# Patient Record
Sex: MALE | Race: WHITE | NOT HISPANIC OR LATINO | Employment: OTHER | URBAN - NONMETROPOLITAN AREA
[De-identification: names, ages, dates, MRNs, and addresses within clinical notes are randomized per-mention and may not be internally consistent; named-entity substitution may affect disease eponyms.]

---

## 2023-02-10 PROBLEM — R73.03 PREDIABETES: Status: ACTIVE | Noted: 2023-02-10

## 2023-02-10 PROBLEM — I10 BENIGN ESSENTIAL HYPERTENSION: Status: ACTIVE | Noted: 2023-02-10

## 2023-02-10 PROBLEM — R35.1 BPH ASSOCIATED WITH NOCTURIA: Status: ACTIVE | Noted: 2023-02-10

## 2023-02-10 PROBLEM — J30.2 SEASONAL ALLERGIES: Status: ACTIVE | Noted: 2023-02-10

## 2023-02-10 PROBLEM — I65.29 CAROTID ATHEROSCLEROSIS: Status: ACTIVE | Noted: 2023-02-10

## 2023-02-10 PROBLEM — M47.812 DJD (DEGENERATIVE JOINT DISEASE) OF CERVICAL SPINE: Status: ACTIVE | Noted: 2023-02-10

## 2023-02-10 PROBLEM — E78.5 HYPERLIPIDEMIA: Status: ACTIVE | Noted: 2023-02-10

## 2023-02-10 PROBLEM — N40.1 BPH ASSOCIATED WITH NOCTURIA: Status: ACTIVE | Noted: 2023-02-10

## 2023-02-10 PROBLEM — G25.81 RESTLESS LEGS SYNDROME: Status: ACTIVE | Noted: 2023-02-10

## 2023-02-10 PROBLEM — R51.9 CHRONIC DAILY HEADACHE: Status: ACTIVE | Noted: 2023-02-10

## 2023-02-10 PROBLEM — E55.9 VITAMIN D DEFICIENCY: Status: ACTIVE | Noted: 2023-02-10

## 2023-02-10 PROBLEM — M81.0 OSTEOPOROSIS: Status: ACTIVE | Noted: 2023-02-10

## 2023-02-10 RX ORDER — ACETAMINOPHEN 160 MG/5ML
SUSPENSION, ORAL (FINAL DOSE FORM) ORAL
COMMUNITY

## 2023-02-10 RX ORDER — VIT C/E/ZN/COPPR/LUTEIN/ZEAXAN 250MG-90MG
2 CAPSULE ORAL DAILY
COMMUNITY

## 2023-02-10 RX ORDER — NAPROXEN SODIUM 220 MG/1
1 TABLET, FILM COATED ORAL DAILY
COMMUNITY

## 2023-02-10 RX ORDER — LISINOPRIL 5 MG/1
1 TABLET ORAL DAILY
COMMUNITY
End: 2023-09-13 | Stop reason: SDUPTHER

## 2023-02-10 RX ORDER — ATORVASTATIN CALCIUM 40 MG/1
1 TABLET, FILM COATED ORAL DAILY
COMMUNITY
End: 2023-09-13 | Stop reason: SDUPTHER

## 2023-03-23 ENCOUNTER — OFFICE VISIT (OUTPATIENT)
Dept: PRIMARY CARE | Facility: CLINIC | Age: 78
End: 2023-03-23
Payer: MEDICARE

## 2023-03-23 ENCOUNTER — LAB (OUTPATIENT)
Dept: LAB | Facility: LAB | Age: 78
End: 2023-03-23
Payer: MEDICARE

## 2023-03-23 VITALS
DIASTOLIC BLOOD PRESSURE: 65 MMHG | HEIGHT: 70 IN | HEART RATE: 71 BPM | OXYGEN SATURATION: 97 % | TEMPERATURE: 97.5 F | BODY MASS INDEX: 23.56 KG/M2 | SYSTOLIC BLOOD PRESSURE: 122 MMHG | WEIGHT: 164.6 LBS | RESPIRATION RATE: 14 BRPM

## 2023-03-23 DIAGNOSIS — R73.03 PREDIABETES: ICD-10-CM

## 2023-03-23 DIAGNOSIS — R35.1 BPH ASSOCIATED WITH NOCTURIA: ICD-10-CM

## 2023-03-23 DIAGNOSIS — E55.9 VITAMIN D DEFICIENCY: ICD-10-CM

## 2023-03-23 DIAGNOSIS — N40.1 BPH ASSOCIATED WITH NOCTURIA: ICD-10-CM

## 2023-03-23 DIAGNOSIS — Z13.89 SCREENING FOR MULTIPLE CONDITIONS: ICD-10-CM

## 2023-03-23 DIAGNOSIS — I65.23 ATHEROSCLEROSIS OF BOTH CAROTID ARTERIES: ICD-10-CM

## 2023-03-23 DIAGNOSIS — I10 BENIGN ESSENTIAL HYPERTENSION: ICD-10-CM

## 2023-03-23 DIAGNOSIS — M81.6 LOCALIZED OSTEOPOROSIS WITHOUT CURRENT PATHOLOGICAL FRACTURE: ICD-10-CM

## 2023-03-23 DIAGNOSIS — Z00.00 ROUTINE GENERAL MEDICAL EXAMINATION AT HEALTH CARE FACILITY: Primary | ICD-10-CM

## 2023-03-23 DIAGNOSIS — E78.2 MIXED HYPERLIPIDEMIA: ICD-10-CM

## 2023-03-23 PROBLEM — G25.81 RESTLESS LEGS SYNDROME: Status: RESOLVED | Noted: 2023-02-10 | Resolved: 2023-03-23

## 2023-03-23 LAB
BASOPHILS (10*3/UL) IN BLOOD BY AUTOMATED COUNT: 0.07 X10E9/L (ref 0–0.1)
BASOPHILS/100 LEUKOCYTES IN BLOOD BY AUTOMATED COUNT: 0.9 % (ref 0–2)
EOSINOPHILS (10*3/UL) IN BLOOD BY AUTOMATED COUNT: 0.16 X10E9/L (ref 0–0.4)
EOSINOPHILS/100 LEUKOCYTES IN BLOOD BY AUTOMATED COUNT: 2.2 % (ref 0–6)
ERYTHROCYTE DISTRIBUTION WIDTH (RATIO) BY AUTOMATED COUNT: 13.3 % (ref 11.5–14.5)
ERYTHROCYTE MEAN CORPUSCULAR HEMOGLOBIN CONCENTRATION (G/DL) BY AUTOMATED: 31.4 G/DL (ref 32–36)
ERYTHROCYTE MEAN CORPUSCULAR VOLUME (FL) BY AUTOMATED COUNT: 98 FL (ref 80–100)
ERYTHROCYTES (10*6/UL) IN BLOOD BY AUTOMATED COUNT: 4.14 X10E12/L (ref 4.5–5.9)
ESTIMATED AVERAGE GLUCOSE FOR HBA1C: 120 MG/DL
HEMATOCRIT (%) IN BLOOD BY AUTOMATED COUNT: 40.4 % (ref 41–52)
HEMOGLOBIN (G/DL) IN BLOOD: 12.7 G/DL (ref 13.5–17.5)
HEMOGLOBIN A1C/HEMOGLOBIN TOTAL IN BLOOD: 5.8 %
IMMATURE GRANULOCYTES/100 LEUKOCYTES IN BLOOD BY AUTOMATED COUNT: 0.3 % (ref 0–0.9)
LEUKOCYTES (10*3/UL) IN BLOOD BY AUTOMATED COUNT: 7.4 X10E9/L (ref 4.4–11.3)
LYMPHOCYTES (10*3/UL) IN BLOOD BY AUTOMATED COUNT: 2.14 X10E9/L (ref 0.8–3)
LYMPHOCYTES/100 LEUKOCYTES IN BLOOD BY AUTOMATED COUNT: 28.8 % (ref 13–44)
MONOCYTES (10*3/UL) IN BLOOD BY AUTOMATED COUNT: 0.8 X10E9/L (ref 0.05–0.8)
MONOCYTES/100 LEUKOCYTES IN BLOOD BY AUTOMATED COUNT: 10.8 % (ref 2–10)
NEUTROPHILS (10*3/UL) IN BLOOD BY AUTOMATED COUNT: 4.23 X10E9/L (ref 1.6–5.5)
NEUTROPHILS/100 LEUKOCYTES IN BLOOD BY AUTOMATED COUNT: 57 % (ref 40–80)
NRBC (PER 100 WBCS) BY AUTOMATED COUNT: 0 /100 WBC (ref 0–0)
PLATELETS (10*3/UL) IN BLOOD AUTOMATED COUNT: 239 X10E9/L (ref 150–450)

## 2023-03-23 PROCEDURE — 3078F DIAST BP <80 MM HG: CPT | Performed by: FAMILY MEDICINE

## 2023-03-23 PROCEDURE — 82728 ASSAY OF FERRITIN: CPT

## 2023-03-23 PROCEDURE — 36415 COLL VENOUS BLD VENIPUNCTURE: CPT

## 2023-03-23 PROCEDURE — 83540 ASSAY OF IRON: CPT

## 2023-03-23 PROCEDURE — 1157F ADVNC CARE PLAN IN RCRD: CPT | Performed by: FAMILY MEDICINE

## 2023-03-23 PROCEDURE — 80061 LIPID PANEL: CPT

## 2023-03-23 PROCEDURE — 83036 HEMOGLOBIN GLYCOSYLATED A1C: CPT

## 2023-03-23 PROCEDURE — 3074F SYST BP LT 130 MM HG: CPT | Performed by: FAMILY MEDICINE

## 2023-03-23 PROCEDURE — 80053 COMPREHEN METABOLIC PANEL: CPT

## 2023-03-23 PROCEDURE — 1159F MED LIST DOCD IN RCRD: CPT | Performed by: FAMILY MEDICINE

## 2023-03-23 PROCEDURE — 1170F FXNL STATUS ASSESSED: CPT | Performed by: FAMILY MEDICINE

## 2023-03-23 PROCEDURE — G0444 DEPRESSION SCREEN ANNUAL: HCPCS | Performed by: FAMILY MEDICINE

## 2023-03-23 PROCEDURE — G0439 PPPS, SUBSEQ VISIT: HCPCS | Performed by: FAMILY MEDICINE

## 2023-03-23 PROCEDURE — 85045 AUTOMATED RETICULOCYTE COUNT: CPT

## 2023-03-23 PROCEDURE — 82306 VITAMIN D 25 HYDROXY: CPT

## 2023-03-23 PROCEDURE — 83550 IRON BINDING TEST: CPT

## 2023-03-23 PROCEDURE — 82746 ASSAY OF FOLIC ACID SERUM: CPT

## 2023-03-23 PROCEDURE — 85025 COMPLETE CBC W/AUTO DIFF WBC: CPT

## 2023-03-23 PROCEDURE — 82607 VITAMIN B-12: CPT

## 2023-03-23 PROCEDURE — 1160F RVW MEDS BY RX/DR IN RCRD: CPT | Performed by: FAMILY MEDICINE

## 2023-03-23 PROCEDURE — 1036F TOBACCO NON-USER: CPT | Performed by: FAMILY MEDICINE

## 2023-03-23 ASSESSMENT — PATIENT HEALTH QUESTIONNAIRE - PHQ9
2. FEELING DOWN, DEPRESSED OR HOPELESS: NOT AT ALL
SUM OF ALL RESPONSES TO PHQ9 QUESTIONS 1 AND 2: 0
1. LITTLE INTEREST OR PLEASURE IN DOING THINGS: NOT AT ALL

## 2023-03-23 ASSESSMENT — ACTIVITIES OF DAILY LIVING (ADL)
MANAGING_FINANCES: INDEPENDENT
DRESSING: INDEPENDENT
BATHING: INDEPENDENT
DOING_HOUSEWORK: INDEPENDENT
TAKING_MEDICATION: INDEPENDENT
GROCERY_SHOPPING: INDEPENDENT

## 2023-03-23 NOTE — ASSESSMENT & PLAN NOTE
Wakes up 2-3 times per night to urinate. Current symptoms are tolerable.  Failed on Flomax in the past.  Discussed treatment with finasteride but was declined by patient.

## 2023-03-23 NOTE — ASSESSMENT & PLAN NOTE
Stable. Good pulse on physical exam today.  Less than 50% stenosis of the right ICA per US from 2017.  Continue cholesterol control, blood pressure control, and aspirin.

## 2023-03-23 NOTE — PROGRESS NOTES
"Subjective   Reason for Visit: Judd Davies is an 78 y.o. male here for a Medicare Wellness visit.      Flu shot- 11/05/2022  COVID shot- moderna   Tdap- Due    Colon Screen- 2010, no longer needed due to age      Reviewed all medications by prescribing practitioner or clinical pharmacist (such as prescriptions, OTCs, herbal therapies and supplements) and documented in the medical record.    HPI  Patient takes CoQ10 daily to prevent statin muscle pain side effect.    BPH: Patient wakes up 2-3 times per night to urinate. He has taken Flomax in the past but it did not provide relief. He finds the current symptoms are manageable.    Patient Care Team:  Haris Osborn MD as PCP - General  Haris Osborn MD as PCP - [employer] PCP     Review of Systems  constitutional: as per HPI  eyes:as per HPI  CV:as per HPI  Respiratory:as per HPI  GI:as per HPI  neuro:as per HPI  endo:as per HPI  heme/lymph:as per HPI     Objective   Vitals:  /65 (BP Location: Right arm, Patient Position: Sitting, BP Cuff Size: Adult)   Pulse 71   Temp 36.4 °C (97.5 °F) (Temporal)   Resp 14   Ht 1.778 m (5' 10\")   Wt 74.7 kg (164 lb 9.6 oz)   SpO2 97%   BMI 23.62 kg/m²       Physical Exam  Constitutional:       Appearance: Normal appearance. He is normal weight.   HENT:      Head: Normocephalic.      Right Ear: Tympanic membrane, ear canal and external ear normal.      Left Ear: Tympanic membrane, ear canal and external ear normal.      Nose: Nose normal.      Mouth/Throat:      Mouth: Mucous membranes are moist.   Eyes:      Conjunctiva/sclera: Conjunctivae normal.      Pupils: Pupils are equal, round, and reactive to light.   Cardiovascular:      Rate and Rhythm: Normal rate and regular rhythm.      Pulses: Normal pulses.      Comments: Strong carotid pulses bilaterally.  Pulmonary:      Effort: Pulmonary effort is normal.      Breath sounds: Normal breath sounds.   Abdominal:      General: Abdomen is flat. Bowel sounds " are normal.      Palpations: Abdomen is soft.   Musculoskeletal:         General: Normal range of motion.      Cervical back: Neck supple.   Skin:     General: Skin is warm.   Neurological:      General: No focal deficit present.      Mental Status: He is alert and oriented to person, place, and time.   Psychiatric:         Mood and Affect: Mood normal.         Behavior: Behavior normal.         Thought Content: Thought content normal.         Judgment: Judgment normal.         Assessment/Plan   Problem List Items Addressed This Visit          Circulatory    Benign essential hypertension    Current Assessment & Plan     Hypertension:  Blood pressure in office today was   BP Readings from Last 1 Encounters:   03/23/23 122/65   The goal range for blood pressure is below 130/80.   We will continue to monitor.   Continue Lisinopril as prescribed          Relevant Orders    CBC and Auto Differential    Comprehensive Metabolic Panel    Follow Up In [facility] - PCP    Carotid atherosclerosis    Current Assessment & Plan     Stable. Good pulse on physical exam today.  Less than 50% stenosis of the right ICA per US from 2017.  Continue cholesterol control, blood pressure control, and aspirin.         Relevant Orders    CBC and Auto Differential    Follow Up [facility] - PCP       Musculoskeletal    Osteoporosis    Current Assessment & Plan     Upper extremity affected.  Continue vitamin D supplement.  Encouraged to engage in regular light resistance training (bands or weights) to preserve bone structure.            Endocrine/Metabolic    Prediabetes    Current Assessment & Plan     Ordered A1c    Most recent A1c:   Hemoglobin A1C (%)   Date Value   03/22/2022 5.9 (A)     You have pre-diabetic level blood sugar.  This means you are at risk for developing diabetes.  ;  I recommend you avoid processed carbohydrates and sugar containing foods/beverages.  If you must have a sweetened beverage, please use  Stevia and avoid artificial sweeteners such as aspartame, sucralose, sweet n low, etc.;DRINK WATER!    A good diet plan to follow is the Mediterranean diet.  Use online search to read more about this.;    Another simple rule is to shop the grocery store perimeter -thus filling your cart with fresh fruits, vegetables, lean meats (fish, chicken); dairy, cheese, and nuts.  Avoid the isles where you find bagged and boxed items that are processed. (chips, crackers, cookies, candies, snacks, etc.);    Aerobic cardiovascular exercise 150min weekly is essential for good health, BP control, sugar metabolism, and weight management.;March 23, 2023           Relevant Orders    CBC and Auto Differential    Comprehensive Metabolic Panel    Hemoglobin A1c    Follow Up [facility] - PCP    Vitamin D deficiency    Current Assessment & Plan     Continue vitamin D supplement.         Relevant Orders    Vitamin D, Total    Follow [facility] - PCP       Other    BPH associated with nocturia    Current Assessment & Plan     Wakes up 2-3 times per night to urinate. Current symptoms are tolerable.  Failed on Flomax in the past.  Discussed treatment with finasteride but was declined by patient.         Relevant Orders    Follow Up [facility] - PCP    Mixed hyperlipidemia    Current Assessment & Plan     Ordered lipid panel.  Continue atorvastatin as prescribed.         Relevant Orders    Lipid Panel    Follow Up [facility] - PCP     Other Visit Diagnoses       Routine general medical examination at health care facility    -  Primary    Screening for multiple conditions              Discussed that the current recommendation for COVID vaccine will be a once per year booster.     Follow up in 6 months.     By signing my name below I, mike Miles, attest that this documentation has been prepared under the direction and in the presence of Dr. Haris Osborn. 03/23/23

## 2023-03-23 NOTE — ASSESSMENT & PLAN NOTE
DEXA 2017 Right Forearm T -2.5; LS and Hip -1.0 and -1.2  Continue vitamin D supplement.  Encouraged to engage in regular light resistance training (bands or weights) to preserve bone structure.

## 2023-03-23 NOTE — ASSESSMENT & PLAN NOTE
Continue vitamin D supplement.  Checking levels for appropriate dosing  Recommended for bone health protection

## 2023-03-23 NOTE — ASSESSMENT & PLAN NOTE
Hypertension:  Blood pressure in office today was   BP Readings from Last 1 Encounters:   03/23/23 122/65     The goal range for blood pressure is below 130/80.   We will continue to monitor.   Continue Lisinopril as prescribed

## 2023-03-23 NOTE — ASSESSMENT & PLAN NOTE
Ordered A1c    Most recent A1c:   Hemoglobin A1C (%)   Date Value   03/22/2022 5.9 (A)       You have pre-diabetic level blood sugar.  This means you are at risk for developing diabetes.  ;  I recommend you avoid processed carbohydrates and sugar containing foods/beverages.  If you must have a sweetened beverage, please use Stevia and avoid artificial sweeteners such as aspartame, sucralose, sweet n low, etc.;DRINK WATER!    A good diet plan to follow is the Mediterranean diet.  Use online search to read more about this.;    Another simple rule is to shop the grocery store perimeter -thus filling your cart with fresh fruits, vegetables, lean meats (fish, chicken); dairy, cheese, and nuts.  Avoid the isles where you find bagged and boxed items that are processed. (chips, crackers, cookies, candies, snacks, etc.);    Aerobic cardiovascular exercise 150min weekly is essential for good health, BP control, sugar metabolism, and weight management.;March 23, 2023

## 2023-03-24 LAB
ALANINE AMINOTRANSFERASE (SGPT) (U/L) IN SER/PLAS: 27 U/L (ref 10–52)
ALBUMIN (G/DL) IN SER/PLAS: 4.2 G/DL (ref 3.4–5)
ALKALINE PHOSPHATASE (U/L) IN SER/PLAS: 72 U/L (ref 33–136)
ANION GAP IN SER/PLAS: 14 MMOL/L (ref 10–20)
ASPARTATE AMINOTRANSFERASE (SGOT) (U/L) IN SER/PLAS: 28 U/L (ref 9–39)
BILIRUBIN TOTAL (MG/DL) IN SER/PLAS: 0.9 MG/DL (ref 0–1.2)
CALCIDIOL (25 OH VITAMIN D3) (NG/ML) IN SER/PLAS: 47 NG/ML
CALCIUM (MG/DL) IN SER/PLAS: 9.3 MG/DL (ref 8.6–10.6)
CARBON DIOXIDE, TOTAL (MMOL/L) IN SER/PLAS: 27 MMOL/L (ref 21–32)
CHLORIDE (MMOL/L) IN SER/PLAS: 102 MMOL/L (ref 98–107)
CHOLESTEROL (MG/DL) IN SER/PLAS: 136 MG/DL (ref 0–199)
CHOLESTEROL IN HDL (MG/DL) IN SER/PLAS: 64.7 MG/DL
CHOLESTEROL/HDL RATIO: 2.1
COBALAMIN (VITAMIN B12) (PG/ML) IN SER/PLAS: 635 PG/ML (ref 211–911)
CREATININE (MG/DL) IN SER/PLAS: 1.17 MG/DL (ref 0.5–1.3)
FERRITIN (UG/LL) IN SER/PLAS: 364 UG/L (ref 20–300)
FOLATE (NG/ML) IN SER/PLAS: 14.1 NG/ML
GFR MALE: 63 ML/MIN/1.73M2
GLUCOSE (MG/DL) IN SER/PLAS: 95 MG/DL (ref 74–99)
HEMOGLOBIN (PG) IN RETICULOCYTES: 35 PG (ref 28–38)
IMMATURE RETIC FRACTION: 4.6 % (ref 0–16)
IRON (UG/DL) IN SER/PLAS: 93 UG/DL (ref 35–150)
IRON BINDING CAPACITY (UG/DL) IN SER/PLAS: 264 UG/DL (ref 240–445)
IRON SATURATION (%) IN SER/PLAS: 35 % (ref 25–45)
LDL: 60 MG/DL (ref 0–99)
POTASSIUM (MMOL/L) IN SER/PLAS: 4.8 MMOL/L (ref 3.5–5.3)
PROTEIN TOTAL: 7.7 G/DL (ref 6.4–8.2)
RETICULOCYTES (10*3/UL) IN BLOOD: 0.04 X10E12/L (ref 0.02–0.11)
RETICULOCYTES/100 ERYTHROCYTES IN BLOOD BY AUTOMATED COUNT: 0.9 % (ref 0.5–2)
SODIUM (MMOL/L) IN SER/PLAS: 138 MMOL/L (ref 136–145)
TRIGLYCERIDE (MG/DL) IN SER/PLAS: 58 MG/DL (ref 0–149)
UREA NITROGEN (MG/DL) IN SER/PLAS: 30 MG/DL (ref 6–23)
VLDL: 12 MG/DL (ref 0–40)

## 2023-03-26 DIAGNOSIS — D64.9 ANEMIA, UNSPECIFIED TYPE: Primary | ICD-10-CM

## 2023-04-24 ENCOUNTER — LAB (OUTPATIENT)
Dept: LAB | Facility: LAB | Age: 78
End: 2023-04-24
Payer: MEDICARE

## 2023-04-24 DIAGNOSIS — D64.9 ANEMIA, UNSPECIFIED TYPE: ICD-10-CM

## 2023-04-24 PROCEDURE — 85025 COMPLETE CBC W/AUTO DIFF WBC: CPT

## 2023-04-24 PROCEDURE — 36415 COLL VENOUS BLD VENIPUNCTURE: CPT

## 2023-04-25 DIAGNOSIS — D64.9 ANEMIA, UNSPECIFIED TYPE: Primary | ICD-10-CM

## 2023-04-25 LAB
BASOPHILS (10*3/UL) IN BLOOD BY AUTOMATED COUNT: 0.06 X10E9/L (ref 0–0.1)
BASOPHILS/100 LEUKOCYTES IN BLOOD BY AUTOMATED COUNT: 0.8 % (ref 0–2)
EOSINOPHILS (10*3/UL) IN BLOOD BY AUTOMATED COUNT: 0.14 X10E9/L (ref 0–0.4)
EOSINOPHILS/100 LEUKOCYTES IN BLOOD BY AUTOMATED COUNT: 1.8 % (ref 0–6)
ERYTHROCYTE DISTRIBUTION WIDTH (RATIO) BY AUTOMATED COUNT: 13.2 % (ref 11.5–14.5)
ERYTHROCYTE MEAN CORPUSCULAR HEMOGLOBIN CONCENTRATION (G/DL) BY AUTOMATED: 32.2 G/DL (ref 32–36)
ERYTHROCYTE MEAN CORPUSCULAR VOLUME (FL) BY AUTOMATED COUNT: 97 FL (ref 80–100)
ERYTHROCYTES (10*6/UL) IN BLOOD BY AUTOMATED COUNT: 4.12 X10E12/L (ref 4.5–5.9)
HEMATOCRIT (%) IN BLOOD BY AUTOMATED COUNT: 39.8 % (ref 41–52)
HEMOGLOBIN (G/DL) IN BLOOD: 12.8 G/DL (ref 13.5–17.5)
IMMATURE GRANULOCYTES/100 LEUKOCYTES IN BLOOD BY AUTOMATED COUNT: 0.3 % (ref 0–0.9)
LEUKOCYTES (10*3/UL) IN BLOOD BY AUTOMATED COUNT: 8 X10E9/L (ref 4.4–11.3)
LYMPHOCYTES (10*3/UL) IN BLOOD BY AUTOMATED COUNT: 2.2 X10E9/L (ref 0.8–3)
LYMPHOCYTES/100 LEUKOCYTES IN BLOOD BY AUTOMATED COUNT: 27.7 % (ref 13–44)
MONOCYTES (10*3/UL) IN BLOOD BY AUTOMATED COUNT: 0.87 X10E9/L (ref 0.05–0.8)
MONOCYTES/100 LEUKOCYTES IN BLOOD BY AUTOMATED COUNT: 10.9 % (ref 2–10)
NEUTROPHILS (10*3/UL) IN BLOOD BY AUTOMATED COUNT: 4.66 X10E9/L (ref 1.6–5.5)
NEUTROPHILS/100 LEUKOCYTES IN BLOOD BY AUTOMATED COUNT: 58.5 % (ref 40–80)
NRBC (PER 100 WBCS) BY AUTOMATED COUNT: 0 /100 WBC (ref 0–0)
PLATELETS (10*3/UL) IN BLOOD AUTOMATED COUNT: 241 X10E9/L (ref 150–450)

## 2023-04-27 LAB
BASOPHILS (10*3/UL) IN BLOOD BY AUTOMATED COUNT: 0.07 X10E9/L (ref 0–0.1)
BASOPHILS/100 LEUKOCYTES IN BLOOD BY AUTOMATED COUNT: 0.8 % (ref 0–2)
EOSINOPHILS (10*3/UL) IN BLOOD BY AUTOMATED COUNT: 0.13 X10E9/L (ref 0–0.4)
EOSINOPHILS/100 LEUKOCYTES IN BLOOD BY AUTOMATED COUNT: 1.5 % (ref 0–6)
ERYTHROCYTE DISTRIBUTION WIDTH (RATIO) BY AUTOMATED COUNT: 13.1 % (ref 11.5–14.5)
ERYTHROCYTE MEAN CORPUSCULAR HEMOGLOBIN CONCENTRATION (G/DL) BY AUTOMATED: 32.4 G/DL (ref 32–36)
ERYTHROCYTE MEAN CORPUSCULAR VOLUME (FL) BY AUTOMATED COUNT: 97 FL (ref 80–100)
ERYTHROCYTES (10*6/UL) IN BLOOD BY AUTOMATED COUNT: 4.24 X10E12/L (ref 4.5–5.9)
HEMATOCRIT (%) IN BLOOD BY AUTOMATED COUNT: 41.1 % (ref 41–52)
HEMOGLOBIN (G/DL) IN BLOOD: 13.3 G/DL (ref 13.5–17.5)
IMMATURE GRANULOCYTES/100 LEUKOCYTES IN BLOOD BY AUTOMATED COUNT: 0.1 % (ref 0–0.9)
LEUKOCYTES (10*3/UL) IN BLOOD BY AUTOMATED COUNT: 8.4 X10E9/L (ref 4.4–11.3)
LYMPHOCYTES (10*3/UL) IN BLOOD BY AUTOMATED COUNT: 2.28 X10E9/L (ref 0.8–3)
LYMPHOCYTES/100 LEUKOCYTES IN BLOOD BY AUTOMATED COUNT: 27.1 % (ref 13–44)
MONOCYTES (10*3/UL) IN BLOOD BY AUTOMATED COUNT: 0.83 X10E9/L (ref 0.05–0.8)
MONOCYTES/100 LEUKOCYTES IN BLOOD BY AUTOMATED COUNT: 9.9 % (ref 2–10)
NEUTROPHILS (10*3/UL) IN BLOOD BY AUTOMATED COUNT: 5.09 X10E9/L (ref 1.6–5.5)
NEUTROPHILS/100 LEUKOCYTES IN BLOOD BY AUTOMATED COUNT: 60.6 % (ref 40–80)
PLATELETS (10*3/UL) IN BLOOD AUTOMATED COUNT: 214 X10E9/L (ref 150–450)

## 2023-04-28 LAB
IMMUNOGLOBULIN LIGHT CHAINS KAPPA/LAMBDA (MASS RATIO) IN SERUM: 2.23 (ref 0.26–1.65)
IMMUNOGLOBULIN LIGHT CHAINS.KAPPA (MG/DL) IN SERUM: 6.64 MG/DL (ref 0.33–1.94)
IMMUNOGLOBULIN LIGHT CHAINS.LAMBDA (MG/DL) IN SERUM: 2.98 MG/DL (ref 0.57–2.63)
PROSTATE SPECIFIC AG (NG/ML) IN SER/PLAS: 0.15 NG/ML (ref 0–4)
THYROTROPIN (MIU/L) IN SER/PLAS BY DETECTION LIMIT <= 0.05 MIU/L: 2.17 MIU/L (ref 0.44–3.98)

## 2023-04-29 LAB
ALBUMIN ELP: 4.4 G/DL (ref 3.4–5)
ALPHA 1: 0.3 G/DL (ref 0.2–0.6)
ALPHA 2: 0.7 G/DL (ref 0.4–1.1)
BETA: 0.8 G/DL (ref 0.5–1.2)
GAMMA GLOBULIN: 1.4 G/DL (ref 0.5–1.4)
PATH REVIEW - SERUM IMMUNOFIXATION: NORMAL
PATH REVIEW-SERUM PROTEIN ELECTROPHORESIS: NORMAL
PROTEIN ELECTROPHORESIS INTERPRETATION: NORMAL
PROTEIN TOTAL: 7.6 G/DL (ref 6.4–8.2)
SERUM IMMUNOFIXATION INTERPRETATION: NORMAL

## 2023-09-13 ENCOUNTER — OFFICE VISIT (OUTPATIENT)
Dept: PRIMARY CARE | Facility: CLINIC | Age: 78
End: 2023-09-13
Payer: MEDICARE

## 2023-09-13 ENCOUNTER — LAB (OUTPATIENT)
Dept: LAB | Facility: LAB | Age: 78
End: 2023-09-13
Payer: MEDICARE

## 2023-09-13 VITALS
DIASTOLIC BLOOD PRESSURE: 63 MMHG | HEART RATE: 67 BPM | TEMPERATURE: 97.6 F | BODY MASS INDEX: 23.12 KG/M2 | OXYGEN SATURATION: 98 % | WEIGHT: 161.1 LBS | SYSTOLIC BLOOD PRESSURE: 113 MMHG | RESPIRATION RATE: 14 BRPM

## 2023-09-13 DIAGNOSIS — E78.2 MIXED HYPERLIPIDEMIA: ICD-10-CM

## 2023-09-13 DIAGNOSIS — I65.23 ATHEROSCLEROSIS OF BOTH CAROTID ARTERIES: ICD-10-CM

## 2023-09-13 DIAGNOSIS — D63.8 ANEMIA IN OTHER CHRONIC DISEASES CLASSIFIED ELSEWHERE: ICD-10-CM

## 2023-09-13 DIAGNOSIS — N40.1 BPH ASSOCIATED WITH NOCTURIA: ICD-10-CM

## 2023-09-13 DIAGNOSIS — E55.9 VITAMIN D DEFICIENCY: ICD-10-CM

## 2023-09-13 DIAGNOSIS — M81.6 LOCALIZED OSTEOPOROSIS WITHOUT CURRENT PATHOLOGICAL FRACTURE: Primary | ICD-10-CM

## 2023-09-13 DIAGNOSIS — R73.03 PREDIABETES: ICD-10-CM

## 2023-09-13 DIAGNOSIS — I10 BENIGN ESSENTIAL HYPERTENSION: ICD-10-CM

## 2023-09-13 DIAGNOSIS — R35.1 BPH ASSOCIATED WITH NOCTURIA: ICD-10-CM

## 2023-09-13 PROCEDURE — 1036F TOBACCO NON-USER: CPT | Performed by: FAMILY MEDICINE

## 2023-09-13 PROCEDURE — 36415 COLL VENOUS BLD VENIPUNCTURE: CPT

## 2023-09-13 PROCEDURE — 1159F MED LIST DOCD IN RCRD: CPT | Performed by: FAMILY MEDICINE

## 2023-09-13 PROCEDURE — 3078F DIAST BP <80 MM HG: CPT | Performed by: FAMILY MEDICINE

## 2023-09-13 PROCEDURE — 3074F SYST BP LT 130 MM HG: CPT | Performed by: FAMILY MEDICINE

## 2023-09-13 PROCEDURE — 1160F RVW MEDS BY RX/DR IN RCRD: CPT | Performed by: FAMILY MEDICINE

## 2023-09-13 PROCEDURE — 83036 HEMOGLOBIN GLYCOSYLATED A1C: CPT

## 2023-09-13 PROCEDURE — 85025 COMPLETE CBC W/AUTO DIFF WBC: CPT

## 2023-09-13 PROCEDURE — 99214 OFFICE O/P EST MOD 30 MIN: CPT | Performed by: FAMILY MEDICINE

## 2023-09-13 PROCEDURE — 1157F ADVNC CARE PLAN IN RCRD: CPT | Performed by: FAMILY MEDICINE

## 2023-09-13 RX ORDER — LISINOPRIL 5 MG/1
5 TABLET ORAL DAILY
Qty: 90 TABLET | Refills: 1 | Status: SHIPPED | OUTPATIENT
Start: 2023-09-13 | End: 2024-03-13 | Stop reason: SDUPTHER

## 2023-09-13 RX ORDER — ATORVASTATIN CALCIUM 40 MG/1
40 TABLET, FILM COATED ORAL DAILY
Qty: 90 TABLET | Refills: 1 | Status: SHIPPED | OUTPATIENT
Start: 2023-09-13 | End: 2024-03-13 | Stop reason: SDUPTHER

## 2023-09-13 ASSESSMENT — ENCOUNTER SYMPTOMS
CARDIOVASCULAR NEGATIVE: 1
RESPIRATORY NEGATIVE: 1
GASTROINTESTINAL NEGATIVE: 1

## 2023-09-13 NOTE — ASSESSMENT & PLAN NOTE
Evaluated by hematology Dr Reyes in 4/2023  Determined no concern and to monitor levels by me  Note that SPEP normal but kappa light chains elevated  Will repeat CBC and cherelle/lambda chains to monitor

## 2023-09-13 NOTE — PROGRESS NOTES
Subjective     Patient ID: Judd Davies is a 78 y.o. male who presents for follow up of chronic medical conditions.      Tdap:09/27/2013  Colon screen no longer needed  Would like to wait on flu shot    High blood pressure evaluation.     Review of symptoms:  Fatigue: N  Headaches:  N  Blurred vision:  N  Palpitations: N   Chest pains:N   Shortness of Breath: N  Swelling of legs: N  Blood in urine: N  Taking medications daily: Y   Medication side effects: N  Problems with medication compliance:N  Baby Aspirin 81 mg daily:  Y    High cholesterol evaluation.     Supplements: Y    specific diet plan: N  exercising 30 min daily?: Y    Fatigue:N  Chest pains:N  Shortness of Breath:N  Sudden Headaches: N  Vision loss: N  Syncope (fainting): N  Leg Claudication (limping/leg tiredness): N   Taking medication daily:Y   Medication side effects:N        Feels great  No symptom concerns    Needs med refills  Discussed visit with Dr Reyes for anemia, he was told no concerns  Inquired about vaccine -flu, covid, rsv and my recommendations      Review of Systems   Respiratory: Negative.     Cardiovascular: Negative.    Gastrointestinal: Negative.        Objective   /63 (BP Location: Left arm, Patient Position: Sitting, BP Cuff Size: Adult)   Pulse 67   Temp 36.4 °C (97.6 °F) (Temporal)   Resp 14   Wt 73.1 kg (161 lb 1.6 oz)   SpO2 98%   BMI 23.12 kg/m²   Physical Exam  Constitutional:       Appearance: Normal appearance.   Cardiovascular:      Rate and Rhythm: Normal rate and regular rhythm.      Pulses: Normal pulses.           Carotid pulses are 2+ on the right side and 2+ on the left side.     Heart sounds: Normal heart sounds.   Pulmonary:      Effort: Pulmonary effort is normal.      Breath sounds: Normal breath sounds.   Neurological:      Mental Status: He is alert.         Assessment/Plan   Problem List Items Addressed This Visit       Benign essential hypertension     Hypertension:  Blood pressure in office  today was   BP Readings from Last 1 Encounters:   09/13/23 113/63   The goal range for blood pressure is below 130/80.   We will continue to monitor.   Continue Lisinopril as prescribed          Relevant Medications    lisinopril 5 mg tablet    BPH associated with nocturia     Wakes up 2-3 times per night to urinate. Current symptoms are tolerable.  Failed on Flomax in the past.  Discussed treatment with finasteride but was declined by patient.         Carotid atherosclerosis     Stable. Good pulse on physical exam today.  Less than 50% stenosis of the right ICA per US from 2017.  Continue cholesterol control, blood pressure control, and aspirin.         Mixed hyperlipidemia     Continue atorvastatin as prescribed.         Relevant Medications    atorvastatin (Lipitor) 40 mg tablet    Osteoporosis - Primary     DEXA 2017 Right Forearm T -2.5; LS and Hip -1.0 and -1.2  Continue vitamin D supplement.  Encouraged to engage in regular light resistance training (bands or weights) to preserve bone structure.         Prediabetes     Ordered A1c    Most recent A1c:   Hemoglobin A1C (%)   Date Value   03/23/2023 5.8 (A)     You have pre-diabetic level blood sugar.  This means you are at risk for developing diabetes.  ;  I recommend you avoid processed carbohydrates and sugar containing foods/beverages.  If you must have a sweetened beverage, please use Stevia and avoid artificial sweeteners such as aspartame, sucralose, sweet n low, etc.;DRINK WATER!    A good diet plan to follow is the Mediterranean diet.  Use online search to read more about this.;    Another simple rule is to shop the grocery store perimeter -thus filling your cart with fresh fruits, vegetables, lean meats (fish, chicken); dairy, cheese, and nuts.  Avoid the isles where you find bagged and boxed items that are processed. (chips, crackers, cookies, candies, snacks, etc.);    Aerobic cardiovascular exercise 150min weekly is essential for good health, BP  control, sugar metabolism, and weight management.;September 13, 2023           Relevant Orders    Hemoglobin A1C    Vitamin D deficiency     Continue vitamin D supplement.  Checking levels for appropriate dosing  Recommended for bone health protection         Anemia in other chronic diseases classified elsewhere     Evaluated by hematology Dr Reyes in 4/2023  Determined no concern and to monitor levels by me  Note that SPEP normal but kappa light chains elevated  Will repeat CBC and cherelle/lambda chains to monitor         Relevant Orders    CBC and Auto Differential        Discussed vaccines  Recommended annual flu and covid  RSV not strongly recommended since not have high risk factors

## 2023-09-13 NOTE — ASSESSMENT & PLAN NOTE
Hypertension:  Blood pressure in office today was   BP Readings from Last 1 Encounters:   09/13/23 113/63     The goal range for blood pressure is below 130/80.   We will continue to monitor.   Continue Lisinopril as prescribed

## 2023-09-13 NOTE — ASSESSMENT & PLAN NOTE
Ordered A1c    Most recent A1c:   Hemoglobin A1C (%)   Date Value   03/23/2023 5.8 (A)       You have pre-diabetic level blood sugar.  This means you are at risk for developing diabetes.  ;  I recommend you avoid processed carbohydrates and sugar containing foods/beverages.  If you must have a sweetened beverage, please use Stevia and avoid artificial sweeteners such as aspartame, sucralose, sweet n low, etc.;DRINK WATER!    A good diet plan to follow is the Mediterranean diet.  Use online search to read more about this.;    Another simple rule is to shop the grocery store perimeter -thus filling your cart with fresh fruits, vegetables, lean meats (fish, chicken); dairy, cheese, and nuts.  Avoid the isles where you find bagged and boxed items that are processed. (chips, crackers, cookies, candies, snacks, etc.);    Aerobic cardiovascular exercise 150min weekly is essential for good health, BP control, sugar metabolism, and weight management.;September 13, 2023

## 2023-09-14 LAB
BASOPHILS (10*3/UL) IN BLOOD BY AUTOMATED COUNT: 0.04 X10E9/L (ref 0–0.1)
BASOPHILS/100 LEUKOCYTES IN BLOOD BY AUTOMATED COUNT: 0.5 % (ref 0–2)
EOSINOPHILS (10*3/UL) IN BLOOD BY AUTOMATED COUNT: 0.14 X10E9/L (ref 0–0.4)
EOSINOPHILS/100 LEUKOCYTES IN BLOOD BY AUTOMATED COUNT: 1.9 % (ref 0–6)
ERYTHROCYTE DISTRIBUTION WIDTH (RATIO) BY AUTOMATED COUNT: 13.3 % (ref 11.5–14.5)
ERYTHROCYTE MEAN CORPUSCULAR HEMOGLOBIN CONCENTRATION (G/DL) BY AUTOMATED: 31.5 G/DL (ref 32–36)
ERYTHROCYTE MEAN CORPUSCULAR VOLUME (FL) BY AUTOMATED COUNT: 98 FL (ref 80–100)
ERYTHROCYTES (10*6/UL) IN BLOOD BY AUTOMATED COUNT: 4.02 X10E12/L (ref 4.5–5.9)
ESTIMATED AVERAGE GLUCOSE FOR HBA1C: 120 MG/DL
HEMATOCRIT (%) IN BLOOD BY AUTOMATED COUNT: 39.4 % (ref 41–52)
HEMOGLOBIN (G/DL) IN BLOOD: 12.4 G/DL (ref 13.5–17.5)
HEMOGLOBIN A1C/HEMOGLOBIN TOTAL IN BLOOD: 5.8 %
IMMATURE GRANULOCYTES/100 LEUKOCYTES IN BLOOD BY AUTOMATED COUNT: 0.3 % (ref 0–0.9)
LEUKOCYTES (10*3/UL) IN BLOOD BY AUTOMATED COUNT: 7.5 X10E9/L (ref 4.4–11.3)
LYMPHOCYTES (10*3/UL) IN BLOOD BY AUTOMATED COUNT: 2.41 X10E9/L (ref 0.8–3)
LYMPHOCYTES/100 LEUKOCYTES IN BLOOD BY AUTOMATED COUNT: 32.2 % (ref 13–44)
MONOCYTES (10*3/UL) IN BLOOD BY AUTOMATED COUNT: 0.82 X10E9/L (ref 0.05–0.8)
MONOCYTES/100 LEUKOCYTES IN BLOOD BY AUTOMATED COUNT: 11 % (ref 2–10)
NEUTROPHILS (10*3/UL) IN BLOOD BY AUTOMATED COUNT: 4.05 X10E9/L (ref 1.6–5.5)
NEUTROPHILS/100 LEUKOCYTES IN BLOOD BY AUTOMATED COUNT: 54.1 % (ref 40–80)
NRBC (PER 100 WBCS) BY AUTOMATED COUNT: 0 /100 WBC (ref 0–0)
PLATELETS (10*3/UL) IN BLOOD AUTOMATED COUNT: 226 X10E9/L (ref 150–450)

## 2023-09-15 DIAGNOSIS — D63.8 ANEMIA IN OTHER CHRONIC DISEASES CLASSIFIED ELSEWHERE: Primary | ICD-10-CM

## 2023-11-14 ENCOUNTER — LAB (OUTPATIENT)
Dept: LAB | Facility: LAB | Age: 78
End: 2023-11-14
Payer: MEDICARE

## 2023-11-14 DIAGNOSIS — D47.2 MGUS (MONOCLONAL GAMMOPATHY OF UNKNOWN SIGNIFICANCE): Primary | ICD-10-CM

## 2023-11-14 DIAGNOSIS — D63.8 ANEMIA IN OTHER CHRONIC DISEASES CLASSIFIED ELSEWHERE: ICD-10-CM

## 2023-11-14 LAB
BASOPHILS # BLD AUTO: 0.06 X10*3/UL (ref 0–0.1)
BASOPHILS NFR BLD AUTO: 0.8 %
EOSINOPHIL # BLD AUTO: 0.27 X10*3/UL (ref 0–0.4)
EOSINOPHIL NFR BLD AUTO: 3.5 %
ERYTHROCYTE [DISTWIDTH] IN BLOOD BY AUTOMATED COUNT: 13.3 % (ref 11.5–14.5)
HCT VFR BLD AUTO: 39.7 % (ref 41–52)
HGB BLD-MCNC: 12.6 G/DL (ref 13.5–17.5)
IMM GRANULOCYTES # BLD AUTO: 0.02 X10*3/UL (ref 0–0.5)
IMM GRANULOCYTES NFR BLD AUTO: 0.3 % (ref 0–0.9)
LYMPHOCYTES # BLD AUTO: 2.78 X10*3/UL (ref 0.8–3)
LYMPHOCYTES NFR BLD AUTO: 35.9 %
MCH RBC QN AUTO: 30.5 PG (ref 26–34)
MCHC RBC AUTO-ENTMCNC: 31.7 G/DL (ref 32–36)
MCV RBC AUTO: 96 FL (ref 80–100)
MONOCYTES # BLD AUTO: 0.81 X10*3/UL (ref 0.05–0.8)
MONOCYTES NFR BLD AUTO: 10.5 %
NEUTROPHILS # BLD AUTO: 3.8 X10*3/UL (ref 1.6–5.5)
NEUTROPHILS NFR BLD AUTO: 49 %
NRBC BLD-RTO: 0 /100 WBCS (ref 0–0)
PLATELET # BLD AUTO: 231 X10*3/UL (ref 150–450)
RBC # BLD AUTO: 4.13 X10*6/UL (ref 4.5–5.9)
WBC # BLD AUTO: 7.7 X10*3/UL (ref 4.4–11.3)

## 2023-11-14 PROCEDURE — 36415 COLL VENOUS BLD VENIPUNCTURE: CPT

## 2023-11-14 PROCEDURE — 85025 COMPLETE CBC W/AUTO DIFF WBC: CPT

## 2024-01-03 ENCOUNTER — APPOINTMENT (OUTPATIENT)
Dept: PRIMARY CARE | Facility: CLINIC | Age: 79
End: 2024-01-03
Payer: MEDICARE

## 2024-03-13 ENCOUNTER — OFFICE VISIT (OUTPATIENT)
Dept: PRIMARY CARE | Facility: CLINIC | Age: 79
End: 2024-03-13
Payer: MEDICARE

## 2024-03-13 ENCOUNTER — LAB (OUTPATIENT)
Dept: LAB | Facility: LAB | Age: 79
End: 2024-03-13
Payer: MEDICARE

## 2024-03-13 VITALS
HEART RATE: 69 BPM | BODY MASS INDEX: 22.9 KG/M2 | OXYGEN SATURATION: 96 % | TEMPERATURE: 97.8 F | HEIGHT: 70 IN | SYSTOLIC BLOOD PRESSURE: 120 MMHG | RESPIRATION RATE: 14 BRPM | WEIGHT: 160 LBS | DIASTOLIC BLOOD PRESSURE: 68 MMHG

## 2024-03-13 DIAGNOSIS — E55.9 VITAMIN D DEFICIENCY: ICD-10-CM

## 2024-03-13 DIAGNOSIS — R35.1 BPH ASSOCIATED WITH NOCTURIA: ICD-10-CM

## 2024-03-13 DIAGNOSIS — Z13.1 DIABETES MELLITUS SCREENING: ICD-10-CM

## 2024-03-13 DIAGNOSIS — N40.1 BPH ASSOCIATED WITH NOCTURIA: ICD-10-CM

## 2024-03-13 DIAGNOSIS — Z13.6 SCREENING FOR CARDIOVASCULAR CONDITION: ICD-10-CM

## 2024-03-13 DIAGNOSIS — R73.03 PREDIABETES: ICD-10-CM

## 2024-03-13 DIAGNOSIS — D63.8 ANEMIA IN OTHER CHRONIC DISEASES CLASSIFIED ELSEWHERE: ICD-10-CM

## 2024-03-13 DIAGNOSIS — I65.23 ATHEROSCLEROSIS OF BOTH CAROTID ARTERIES: ICD-10-CM

## 2024-03-13 DIAGNOSIS — M81.6 LOCALIZED OSTEOPOROSIS WITHOUT CURRENT PATHOLOGICAL FRACTURE: ICD-10-CM

## 2024-03-13 DIAGNOSIS — D47.2 MGUS (MONOCLONAL GAMMOPATHY OF UNKNOWN SIGNIFICANCE): ICD-10-CM

## 2024-03-13 DIAGNOSIS — Z00.00 ROUTINE GENERAL MEDICAL EXAMINATION AT HEALTH CARE FACILITY: Primary | ICD-10-CM

## 2024-03-13 DIAGNOSIS — I10 BENIGN ESSENTIAL HYPERTENSION: ICD-10-CM

## 2024-03-13 DIAGNOSIS — Z12.5 SCREENING FOR PROSTATE CANCER: ICD-10-CM

## 2024-03-13 DIAGNOSIS — E78.2 MIXED HYPERLIPIDEMIA: ICD-10-CM

## 2024-03-13 PROCEDURE — 84155 ASSAY OF PROTEIN SERUM: CPT

## 2024-03-13 PROCEDURE — 80053 COMPREHEN METABOLIC PANEL: CPT

## 2024-03-13 PROCEDURE — G0103 PSA SCREENING: HCPCS

## 2024-03-13 PROCEDURE — 1123F ACP DISCUSS/DSCN MKR DOCD: CPT | Performed by: FAMILY MEDICINE

## 2024-03-13 PROCEDURE — 83036 HEMOGLOBIN GLYCOSYLATED A1C: CPT

## 2024-03-13 PROCEDURE — 1157F ADVNC CARE PLAN IN RCRD: CPT | Performed by: FAMILY MEDICINE

## 2024-03-13 PROCEDURE — 1036F TOBACCO NON-USER: CPT | Performed by: FAMILY MEDICINE

## 2024-03-13 PROCEDURE — 84165 PROTEIN E-PHORESIS SERUM: CPT

## 2024-03-13 PROCEDURE — 1170F FXNL STATUS ASSESSED: CPT | Performed by: FAMILY MEDICINE

## 2024-03-13 PROCEDURE — G0439 PPPS, SUBSEQ VISIT: HCPCS | Performed by: FAMILY MEDICINE

## 2024-03-13 PROCEDURE — 1158F ADVNC CARE PLAN TLK DOCD: CPT | Performed by: FAMILY MEDICINE

## 2024-03-13 PROCEDURE — 83521 IG LIGHT CHAINS FREE EACH: CPT

## 2024-03-13 PROCEDURE — 3078F DIAST BP <80 MM HG: CPT | Performed by: FAMILY MEDICINE

## 2024-03-13 PROCEDURE — 80061 LIPID PANEL: CPT

## 2024-03-13 PROCEDURE — 84165 PROTEIN E-PHORESIS SERUM: CPT | Performed by: FAMILY MEDICINE

## 2024-03-13 PROCEDURE — 85025 COMPLETE CBC W/AUTO DIFF WBC: CPT

## 2024-03-13 PROCEDURE — 99214 OFFICE O/P EST MOD 30 MIN: CPT | Performed by: FAMILY MEDICINE

## 2024-03-13 PROCEDURE — 1159F MED LIST DOCD IN RCRD: CPT | Performed by: FAMILY MEDICINE

## 2024-03-13 PROCEDURE — 36415 COLL VENOUS BLD VENIPUNCTURE: CPT

## 2024-03-13 PROCEDURE — 82306 VITAMIN D 25 HYDROXY: CPT

## 2024-03-13 PROCEDURE — 3074F SYST BP LT 130 MM HG: CPT | Performed by: FAMILY MEDICINE

## 2024-03-13 PROCEDURE — 1160F RVW MEDS BY RX/DR IN RCRD: CPT | Performed by: FAMILY MEDICINE

## 2024-03-13 RX ORDER — LISINOPRIL 5 MG/1
5 TABLET ORAL DAILY
Qty: 90 TABLET | Refills: 1 | Status: SHIPPED | OUTPATIENT
Start: 2024-03-13

## 2024-03-13 RX ORDER — ATORVASTATIN CALCIUM 40 MG/1
40 TABLET, FILM COATED ORAL DAILY
Qty: 90 TABLET | Refills: 1 | Status: SHIPPED | OUTPATIENT
Start: 2024-03-13

## 2024-03-13 ASSESSMENT — ACTIVITIES OF DAILY LIVING (ADL)
MANAGING_FINANCES: INDEPENDENT
BATHING: INDEPENDENT
DOING_HOUSEWORK: INDEPENDENT
DRESSING: INDEPENDENT
GROCERY_SHOPPING: INDEPENDENT
TAKING_MEDICATION: INDEPENDENT

## 2024-03-13 ASSESSMENT — ENCOUNTER SYMPTOMS: LIGHT-HEADEDNESS: 1

## 2024-03-13 ASSESSMENT — PATIENT HEALTH QUESTIONNAIRE - PHQ9
SUM OF ALL RESPONSES TO PHQ9 QUESTIONS 1 AND 2: 0
2. FEELING DOWN, DEPRESSED OR HOPELESS: NOT AT ALL
1. LITTLE INTEREST OR PLEASURE IN DOING THINGS: NOT AT ALL

## 2024-03-13 NOTE — ASSESSMENT & PLAN NOTE
Hemoglobin A1C (%)   Date Value   09/13/2023 5.8 (A)   You have pre-diabetic level blood sugar.  This means you are at risk for developing diabetes.  ;  I recommend you avoid processed carbohydrates and sugar containing foods/beverages.  If you must have a sweetened beverage, please use Stevia and avoid artificial sweeteners such as aspartame, sucralose, sweet n low, etc.;DRINK WATER!    A good diet plan to follow is the Mediterranean diet.  Use online search to read more about this.;    Another simple rule is to shop the grocery store perimeter -thus filling your cart with fresh fruits, vegetables, lean meats (fish, chicken); dairy, cheese, and nuts.  Avoid the isles where you find bagged and boxed items that are processed. (chips, crackers, cookies, candies, snacks, etc.);    Aerobic cardiovascular exercise 150min weekly is essential for good health, BP control, sugar metabolism, and weight management.;March 13, 2024

## 2024-03-13 NOTE — ASSESSMENT & PLAN NOTE
Good pulse on physical exam today both sides - but given syptoms of dizziness, lightheadedness - recommend re-evaluating carotid artery status for possible higher up stenosis  Less than 50% stenosis of the right ICA per US from 2017.  Continue cholesterol control, blood pressure control, and aspirin.

## 2024-03-13 NOTE — ASSESSMENT & PLAN NOTE
Blood pressure in office today was   BP Readings from Last 1 Encounters:   03/13/24 120/68   The goal range for blood pressure is below 130/80.   We will continue to monitor.   Continue lisinopril 5 mg daily.

## 2024-03-13 NOTE — ASSESSMENT & PLAN NOTE
Lab Results   Component Value Date    CHOL 136 03/23/2023    CHOL 140 03/15/2021    CHOL 148 03/06/2020     Lab Results   Component Value Date    HDL 64.7 03/23/2023    HDL 58.3 03/15/2021    HDL 66.9 03/06/2020     Lab Results   Component Value Date    TRIG 58 03/23/2023    TRIG 70 03/15/2021    TRIG 80 03/06/2020   Stable.  Continue atorvastatin 40 mg daily.
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
## 2024-03-13 NOTE — ASSESSMENT & PLAN NOTE
DEXA 2017 Right Forearm T -2.5; LS and Hip -1.0 and -1.2  Continue vitamin D supplement.  Encouraged to engage in regular light resistance training (bands or weights) to preserve bone structure.  Repeat DEXA ordered to re-evaluate status

## 2024-03-13 NOTE — PROGRESS NOTES
Office Note    Assessment & Plan:   diabetes needs further observation, needs improvement, patient poorly compliant, and needs to follow diet more regularly, hypertension needs further observation, needs improvement, patient poorly compliant, and needs to follow diet more regularly, hyperlipidemia needs further observation, needs improvement, patient poorly compliant, and needs to follow diet more regularly.    Diabetic issues reviewed with him: diabetic diet discussed in detail, written exchange diet given, low cholesterol diet, weight control and daily exercise discussed, home glucose monitoring emphasized, all medications, side effects and compliance discussed carefully, diabetic Sick Day rules reviewed, handout given, foot care discussed and Podiatry visits discussed, annual eye examinations at Ophthalmology discussed, glycohemoglobin and other lab monitoring discussed, long term diabetic complications discussed, and labs immediately prior to next visit.   Hypertension issues reviewed with him: Continue current therapy, Follow-up with cardiology    1. Medicare annual wellness visit, subsequent  2. Encounter for attention to ileostomy ([facility])  3. Severe protein-calorie malnutrition (HCC)  4. Hypertensive heart disease with heart failure ([facility])  Assessment & Plan:   Monitored by specialist- no acute findings meriting change in the plan  5. Malignant neoplasm of descending colon ([facility])  Assessment & Plan:   Monitored by specialist- no acute findings meriting change in the plan  6. Secondary hypercoagulable state ([facility])  7. Type 2 diabetes mellitus without complication, without long-term current use of insulin ([facility])  -      DIABETES EDUCATION  8. Anxiety  -     hydrOXYzine HCl (ATARAX) 25 MG tablet; Take 1 tablet by mouth every 6 hours as needed for Anxiety, Disp-120 tablet, R-1Normal     Follow-up and Dispositions    Return in 6 months (on 8/16/2024) for HTN, HLD, DM, In Office (ONLY), Fasting Labs 1 Wk Prior.    Subjective   Reason for Visit: Judd Davies is an 79 y.o. male here for a Medicare Wellness visit.      Would like to get labs done for his A1c and anemia.     Reviewed all medications by prescribing practitioner or clinical pharmacist (such as prescriptions, OTCs, herbal therapies and supplements) and documented in the medical record.    HPI    Patient Care Team:  Haris Osborn MD as PCP - General  Haris Osborn MD as PCP - [employer] PCP     Review of Systems    Objective   Vitals:  There were no vitals taken for this visit.      Physical Exam    Assessment/Plan   Problem List Items Addressed This Visit    None

## 2024-03-14 LAB
ALBUMIN SERPL BCP-MCNC: 4.3 G/DL (ref 3.4–5)
ALBUMIN: 4.1 G/DL (ref 3.4–5)
ALP SERPL-CCNC: 62 U/L (ref 33–136)
ALPHA 1 GLOBULIN: 0.2 G/DL (ref 0.2–0.6)
ALPHA 2 GLOBULIN: 0.7 G/DL (ref 0.4–1.1)
ALT SERPL W P-5'-P-CCNC: 24 U/L (ref 10–52)
ANION GAP SERPL CALC-SCNC: 13 MMOL/L (ref 10–20)
AST SERPL W P-5'-P-CCNC: 26 U/L (ref 9–39)
BASOPHILS # BLD AUTO: 0.08 X10*3/UL (ref 0–0.1)
BASOPHILS NFR BLD AUTO: 1.2 %
BETA GLOBULIN: 0.8 G/DL (ref 0.5–1.2)
BILIRUB SERPL-MCNC: 0.9 MG/DL (ref 0–1.2)
BUN SERPL-MCNC: 24 MG/DL (ref 6–23)
CALCIUM SERPL-MCNC: 9 MG/DL (ref 8.6–10.6)
CHLORIDE SERPL-SCNC: 104 MMOL/L (ref 98–107)
CHOLEST SERPL-MCNC: 132 MG/DL (ref 0–199)
CHOLESTEROL/HDL RATIO: 2.2
CO2 SERPL-SCNC: 25 MMOL/L (ref 21–32)
CREAT SERPL-MCNC: 1.19 MG/DL (ref 0.5–1.3)
EGFRCR SERPLBLD CKD-EPI 2021: 61 ML/MIN/1.73M*2
EOSINOPHIL # BLD AUTO: 0.18 X10*3/UL (ref 0–0.4)
EOSINOPHIL NFR BLD AUTO: 2.6 %
ERYTHROCYTE [DISTWIDTH] IN BLOOD BY AUTOMATED COUNT: 13 % (ref 11.5–14.5)
EST. AVERAGE GLUCOSE BLD GHB EST-MCNC: 117 MG/DL
GAMMA GLOBULIN: 1.3 G/DL (ref 0.5–1.4)
GLUCOSE SERPL-MCNC: 98 MG/DL (ref 74–99)
HBA1C MFR BLD: 5.7 %
HCT VFR BLD AUTO: 39.9 % (ref 41–52)
HDLC SERPL-MCNC: 59 MG/DL
HGB BLD-MCNC: 12.5 G/DL (ref 13.5–17.5)
IMM GRANULOCYTES # BLD AUTO: 0.01 X10*3/UL (ref 0–0.5)
IMM GRANULOCYTES NFR BLD AUTO: 0.1 % (ref 0–0.9)
KAPPA LC SERPL-MCNC: 6.79 MG/DL (ref 0.33–1.94)
KAPPA LC/LAMBDA SER: 2.56 {RATIO} (ref 0.26–1.65)
LAMBDA LC SERPL-MCNC: 2.65 MG/DL (ref 0.57–2.63)
LDLC SERPL CALC-MCNC: 60 MG/DL
LYMPHOCYTES # BLD AUTO: 2.11 X10*3/UL (ref 0.8–3)
LYMPHOCYTES NFR BLD AUTO: 30.7 %
MCH RBC QN AUTO: 30.4 PG (ref 26–34)
MCHC RBC AUTO-ENTMCNC: 31.3 G/DL (ref 32–36)
MCV RBC AUTO: 97 FL (ref 80–100)
MONOCYTES # BLD AUTO: 0.7 X10*3/UL (ref 0.05–0.8)
MONOCYTES NFR BLD AUTO: 10.2 %
NEUTROPHILS # BLD AUTO: 3.8 X10*3/UL (ref 1.6–5.5)
NEUTROPHILS NFR BLD AUTO: 55.2 %
NON HDL CHOLESTEROL: 73 MG/DL (ref 0–149)
NRBC BLD-RTO: 0 /100 WBCS (ref 0–0)
PATH REVIEW-SERUM PROTEIN ELECTROPHORESIS: NORMAL
PLATELET # BLD AUTO: 236 X10*3/UL (ref 150–450)
POTASSIUM SERPL-SCNC: 4.4 MMOL/L (ref 3.5–5.3)
PROT SERPL-MCNC: 7.1 G/DL (ref 6.4–8.2)
PROT SERPL-MCNC: 7.1 G/DL (ref 6.4–8.2)
PROTEIN ELECTROPHORESIS COMMENT: NORMAL
PSA SERPL-MCNC: 0.19 NG/ML
RBC # BLD AUTO: 4.11 X10*6/UL (ref 4.5–5.9)
SODIUM SERPL-SCNC: 138 MMOL/L (ref 136–145)
TRIGL SERPL-MCNC: 64 MG/DL (ref 0–149)
VLDL: 13 MG/DL (ref 0–40)
WBC # BLD AUTO: 6.9 X10*3/UL (ref 4.4–11.3)

## 2024-03-15 LAB — 25(OH)D3 SERPL-MCNC: 46 NG/ML (ref 30–100)

## 2024-04-10 ENCOUNTER — HOSPITAL ENCOUNTER (OUTPATIENT)
Dept: RADIOLOGY | Facility: CLINIC | Age: 79
Discharge: HOME | End: 2024-04-10
Payer: MEDICARE

## 2024-04-10 ENCOUNTER — HOSPITAL ENCOUNTER (OUTPATIENT)
Dept: VASCULAR MEDICINE | Facility: CLINIC | Age: 79
Discharge: HOME | End: 2024-04-10
Payer: MEDICARE

## 2024-04-10 DIAGNOSIS — I65.23 ATHEROSCLEROSIS OF BOTH CAROTID ARTERIES: ICD-10-CM

## 2024-04-10 DIAGNOSIS — M81.6 LOCALIZED OSTEOPOROSIS WITHOUT CURRENT PATHOLOGICAL FRACTURE: ICD-10-CM

## 2024-04-10 PROCEDURE — 93880 EXTRACRANIAL BILAT STUDY: CPT

## 2024-04-10 PROCEDURE — 93880 EXTRACRANIAL BILAT STUDY: CPT | Performed by: SURGERY

## 2024-04-10 PROCEDURE — 77080 DXA BONE DENSITY AXIAL: CPT

## 2024-07-31 ENCOUNTER — APPOINTMENT (OUTPATIENT)
Dept: DERMATOLOGY | Facility: CLINIC | Age: 79
End: 2024-07-31
Payer: MEDICARE

## 2024-09-03 ENCOUNTER — APPOINTMENT (OUTPATIENT)
Dept: DERMATOLOGY | Facility: CLINIC | Age: 79
End: 2024-09-03
Payer: MEDICARE

## 2024-09-03 DIAGNOSIS — D18.01 HEMANGIOMA OF SKIN: ICD-10-CM

## 2024-09-03 DIAGNOSIS — L82.1 SEBORRHEIC KERATOSIS: ICD-10-CM

## 2024-09-03 DIAGNOSIS — L81.4 LENTIGO: ICD-10-CM

## 2024-09-03 DIAGNOSIS — L57.0 ACTINIC KERATOSIS: ICD-10-CM

## 2024-09-03 DIAGNOSIS — D48.5 NEOPLASM OF UNCERTAIN BEHAVIOR OF SKIN: Primary | ICD-10-CM

## 2024-09-03 DIAGNOSIS — D22.9 MULTIPLE BENIGN NEVI: ICD-10-CM

## 2024-09-03 PROCEDURE — 1157F ADVNC CARE PLAN IN RCRD: CPT | Performed by: DERMATOLOGY

## 2024-09-03 PROCEDURE — 1159F MED LIST DOCD IN RCRD: CPT | Performed by: DERMATOLOGY

## 2024-09-03 PROCEDURE — 11301 SHAVE SKIN LESION 0.6-1.0 CM: CPT | Performed by: DERMATOLOGY

## 2024-09-03 PROCEDURE — 1036F TOBACCO NON-USER: CPT | Performed by: DERMATOLOGY

## 2024-09-03 PROCEDURE — 99213 OFFICE O/P EST LOW 20 MIN: CPT | Performed by: DERMATOLOGY

## 2024-09-03 PROCEDURE — 17003 DESTRUCT PREMALG LES 2-14: CPT | Performed by: DERMATOLOGY

## 2024-09-03 PROCEDURE — 1160F RVW MEDS BY RX/DR IN RCRD: CPT | Performed by: DERMATOLOGY

## 2024-09-03 PROCEDURE — 17000 DESTRUCT PREMALG LESION: CPT | Performed by: DERMATOLOGY

## 2024-09-03 NOTE — PROGRESS NOTES
Subjective     Judd Davies is a 79 y.o. male who presents for the following: Skin Check (Personal history of Actinic Keratosis. Chaperone offered and declined. ).     Review of Systems:  No other skin or systemic complaints other than what is documented elsewhere in the note.    The following portions of the chart were reviewed this encounter and updated as appropriate:  Tobacco  Allergies  Meds  Problems  Med Hx  Surg Hx  Fam Hx         Skin Cancer History  No skin cancer on file.      Specialty Problems    None       Objective     Well appearing patient in no apparent distress; mood and affect are within normal limits.    A full examination was performed including scalp, face, neck, chest, abdomen, back, bilateral upper extremities, bilateral lower extremities. Patient declines exam underneath the underwear; patient declines exam of the lower abdomen/mons pubis, buttocks, groin, genitalia, perineal and perianal skin and these areas were not examined.    All findings within normal limits unless otherwise noted below.    Assessment/Plan   1. Neoplasm of uncertain behavior of skin  Right Upper Back  6mm papule with ovoid nest and spoke wheel              Shave removal    Lesion diameter (cm):  0.6  Informed consent: discussed and consent obtained    Timeout: patient name, date of birth, surgical site, and procedure verified    Procedure prep:  Patient was prepped and draped  Anesthesia: the lesion was anesthetized in a standard fashion    Anesthetic:  1% lidocaine w/ epinephrine 1-100,000 local infiltration  Instrument used: DermaBlade    Hemostasis achieved with: aluminum chloride    Outcome: patient tolerated procedure well    Post-procedure details: sterile dressing applied and wound care instructions given    Dressing type: bandage and petrolatum      Staff Communication: Dermatology Local Anesthesia: Site Location: R upper back 1 % Lidocaine / Epinephrine - Amount: 0.5cc    Specimen 1 -  Dermatopathology- DERM LAB  Differential Diagnosis: r/o BCC v ISK  Check Margins Yes/No?:    Comments:    Dermpath Lab: Routine Histopathology (formalin-fixed tissue)    2. Actinic keratosis (2)  Left Forehead, Left Temple  Erythematous scaly macule(s)    -Discussed nature of diagnosis and treatment options.   -Patient wishes to proceed with Cryotherapy today  -Possible side effects of liquid nitrogen treatment reviewed including formation of blisters, crusting, tenderness, scar, and discoloration which may be permanent.  -Patient advised to return the office for re-evaluation if the treated lesion(s) do not resolve within 4-6 weeks. Patient verbalizes understanding.    Destr of lesion - Left Forehead, Left Temple  Complexity: simple    Destruction method: cryotherapy    Informed consent: discussed and consent obtained    Lesion destroyed using liquid nitrogen: Yes    Outcome: patient tolerated procedure well with no complications    Post-procedure details: wound care instructions given      3. Multiple benign nevi  Brown and tan macules and papules with reassuring findings on dermoscopy    -These lesions have benign, reassuring patterns on dermoscopy  -Recommend continued self observation, and to contact the office if any changes in nevi are noticed    4. Lentigo  Tan macules    -Benign appearing on exam  -Reassurance, recommend observation    5. Seborrheic keratosis  Stuck on, waxy macule(s)/papule(s)/plaque(s) with comedo-like openings and milia like cysts    -Discussed the nature of the diagnosis  -Reassurance, recommend continued observation    6. Hemangioma of skin  Cherry red papules    -Discussed the nature of the diagnosis  -Reassurance, recommend continued observation        Discussed/information given on safe sun practices and use of sunscreen, sun protective clothing or sun avoidance. Recommend to use over the counter medication of sunscreen with a SPF 30 or higher on a daily basis prior to sun exposure  to reduce the risk of skin cancer.    Follow up in 1 year for FSE  Discussed if there are any changes or development of concerning symptoms (lesion/skin condition is changing, bleeding, enlarging, or worsening) the patient is to contact my office. The patient verbalizes understanding.     Cassie Pabon MD  9/3/2024

## 2024-09-05 DIAGNOSIS — I10 BENIGN ESSENTIAL HYPERTENSION: ICD-10-CM

## 2024-09-05 LAB
LABORATORY COMMENT REPORT: NORMAL
PATH REPORT.FINAL DX SPEC: NORMAL
PATH REPORT.GROSS SPEC: NORMAL
PATH REPORT.MICROSCOPIC SPEC OTHER STN: NORMAL
PATH REPORT.RELEVANT HX SPEC: NORMAL
PATH REPORT.TOTAL CANCER: NORMAL

## 2024-09-05 RX ORDER — LISINOPRIL 5 MG/1
5 TABLET ORAL DAILY
Qty: 90 TABLET | Refills: 1 | Status: SHIPPED | OUTPATIENT
Start: 2024-09-05

## 2024-09-06 NOTE — RESULT ENCOUNTER NOTE
Pt wife was contacted and notified of malignant results.  Can you please reach out to patient to schedule for ED&C with Dr. Bach for lesion to right upper back?    Thank you!    Alfonso Wells LPN

## 2024-09-06 NOTE — RESULT ENCOUNTER NOTE
Reviewed pathology results, indicating a diagnosis of a basal cell type of skin cancer, superficial subtype. I recommend that this be treated with electrodesiccation and curettage for definitive therapy.  Please contact [facility] to schedule the patient for a standard appointment time with me for the procedure.    Please let the patient know, thank you.

## 2024-09-06 NOTE — RESULT ENCOUNTER NOTE
Pt was contacted and message was left requesting call back regarding biopsy results.  Call back number left at this time.       Alfonso Wells LPN

## 2024-09-11 ENCOUNTER — APPOINTMENT (OUTPATIENT)
Dept: DERMATOLOGY | Facility: CLINIC | Age: 79
End: 2024-09-11
Payer: MEDICARE

## 2024-09-11 DIAGNOSIS — C44.519 BASAL CELL CARCINOMA (BCC) OF SKIN OF OTHER PART OF TORSO: Primary | ICD-10-CM

## 2024-09-11 PROCEDURE — 1159F MED LIST DOCD IN RCRD: CPT | Performed by: DERMATOLOGY

## 2024-09-11 PROCEDURE — 1157F ADVNC CARE PLAN IN RCRD: CPT | Performed by: DERMATOLOGY

## 2024-09-11 PROCEDURE — 17262 DSTRJ MAL LES T/A/L 1.1-2.0: CPT | Performed by: DERMATOLOGY

## 2024-09-11 PROCEDURE — 1160F RVW MEDS BY RX/DR IN RCRD: CPT | Performed by: DERMATOLOGY

## 2024-09-11 PROCEDURE — 1036F TOBACCO NON-USER: CPT | Performed by: DERMATOLOGY

## 2024-09-11 NOTE — PROGRESS NOTES
Subjective     Judd Davies is a 79 y.o. male who presents for the following: ED&C (Electrodesiccation & Curettage) (Left upper back bcc superficial bx proven 9.3.24).     Review of Systems:  No other skin or systemic complaints other than what is documented elsewhere in the note.    The following portions of the chart were reviewed this encounter and updated as appropriate:   Tobacco  Allergies  Meds  Problems  Med Hx  Surg Hx  Fam Hx         Skin Cancer History  Biopsy Date Type Location Status   9/3/24 BCC, Superficial Right Upper Back Treatment Complete  9/11/24       Specialty Problems    None       Objective   Well appearing patient in no apparent distress; mood and affect are within normal limits.    A focused skin examination was performed. All findings within normal limits unless otherwise noted below.    Assessment/Plan   1. Basal cell carcinoma (BCC) of skin of other part of torso  Right Upper Back  Biopsy proven superficial basal cell carcinoma    Destr of lesion  Complexity: simple    Destruction method: electrodesiccation and curettage    Informed consent: discussed and consent obtained    Timeout:  patient name, date of birth, surgical site, and procedure verified  Procedure prep:  Patient was prepped and draped  Anesthesia: the lesion was anesthetized in a standard fashion    Anesthetic:  1% lidocaine w/ epinephrine 1-100,000 local infiltration  Curettage performed in three different directions: Yes    Electrodesiccation performed over the curetted area: Yes    Curettage cycles:  3  Lesion length (cm):  0.7  Lesion width (cm):  0.7  Margin per side (cm):  0.4  Final wound size (cm):  1.5  Hemostasis achieved with:  electrodesiccation  Outcome: patient tolerated procedure well with no complications    Additional details:  The lesion was treated via destruction using the electrodesiccation and curettage technique. The patient understands that the specimen will not be sent to pathology. The  patient was also advised that should the lesion recur, it may be treated with excision or Mohs surgery at that time.    Staff Communication: Dermatology Local Anesthesia: 1 % Lidocaine / Epinephrine - R upper back Amount: 2cc        Follow up in 6 months for FSE  Discussed if there are any changes or development of concerning symptoms (lesion/skin condition is changing, bleeding, enlarging, or worsening) the patient is to contact my office. The patient verbalizes understanding.    Cassie Pabon MD  9/11/2024

## 2024-09-18 ENCOUNTER — APPOINTMENT (OUTPATIENT)
Dept: PRIMARY CARE | Facility: CLINIC | Age: 79
End: 2024-09-18
Payer: MEDICARE

## 2024-09-18 ENCOUNTER — LAB (OUTPATIENT)
Dept: LAB | Facility: LAB | Age: 79
End: 2024-09-18
Payer: MEDICARE

## 2024-09-18 VITALS
WEIGHT: 159.6 LBS | RESPIRATION RATE: 14 BRPM | HEART RATE: 71 BPM | HEIGHT: 70 IN | TEMPERATURE: 97.8 F | OXYGEN SATURATION: 98 % | BODY MASS INDEX: 22.85 KG/M2 | DIASTOLIC BLOOD PRESSURE: 71 MMHG | SYSTOLIC BLOOD PRESSURE: 127 MMHG

## 2024-09-18 DIAGNOSIS — I65.23 ATHEROSCLEROSIS OF BOTH CAROTID ARTERIES: ICD-10-CM

## 2024-09-18 DIAGNOSIS — R73.03 PREDIABETES: ICD-10-CM

## 2024-09-18 DIAGNOSIS — I10 BENIGN ESSENTIAL HYPERTENSION: Primary | ICD-10-CM

## 2024-09-18 DIAGNOSIS — M85.80 OSTEOPENIA, UNSPECIFIED LOCATION: ICD-10-CM

## 2024-09-18 DIAGNOSIS — D63.8 ANEMIA IN OTHER CHRONIC DISEASES CLASSIFIED ELSEWHERE: ICD-10-CM

## 2024-09-18 DIAGNOSIS — E55.9 VITAMIN D DEFICIENCY: ICD-10-CM

## 2024-09-18 DIAGNOSIS — E78.2 MIXED HYPERLIPIDEMIA: ICD-10-CM

## 2024-09-18 PROBLEM — M81.0 OSTEOPOROSIS: Status: RESOLVED | Noted: 2023-02-10 | Resolved: 2024-09-18

## 2024-09-18 PROCEDURE — 1160F RVW MEDS BY RX/DR IN RCRD: CPT | Performed by: FAMILY MEDICINE

## 2024-09-18 PROCEDURE — 1159F MED LIST DOCD IN RCRD: CPT | Performed by: FAMILY MEDICINE

## 2024-09-18 PROCEDURE — 3074F SYST BP LT 130 MM HG: CPT | Performed by: FAMILY MEDICINE

## 2024-09-18 PROCEDURE — 99214 OFFICE O/P EST MOD 30 MIN: CPT | Performed by: FAMILY MEDICINE

## 2024-09-18 PROCEDURE — 1157F ADVNC CARE PLAN IN RCRD: CPT | Performed by: FAMILY MEDICINE

## 2024-09-18 PROCEDURE — 1123F ACP DISCUSS/DSCN MKR DOCD: CPT | Performed by: FAMILY MEDICINE

## 2024-09-18 PROCEDURE — 36415 COLL VENOUS BLD VENIPUNCTURE: CPT

## 2024-09-18 PROCEDURE — 1036F TOBACCO NON-USER: CPT | Performed by: FAMILY MEDICINE

## 2024-09-18 PROCEDURE — 83036 HEMOGLOBIN GLYCOSYLATED A1C: CPT

## 2024-09-18 PROCEDURE — 3078F DIAST BP <80 MM HG: CPT | Performed by: FAMILY MEDICINE

## 2024-09-18 PROCEDURE — 1158F ADVNC CARE PLAN TLK DOCD: CPT | Performed by: FAMILY MEDICINE

## 2024-09-18 PROCEDURE — 85025 COMPLETE CBC W/AUTO DIFF WBC: CPT

## 2024-09-18 RX ORDER — LISINOPRIL 5 MG/1
5 TABLET ORAL DAILY
Qty: 90 TABLET | Refills: 1 | Status: SHIPPED | OUTPATIENT
Start: 2024-09-18

## 2024-09-18 RX ORDER — ATORVASTATIN CALCIUM 40 MG/1
40 TABLET, FILM COATED ORAL DAILY
Qty: 90 TABLET | Refills: 1 | Status: SHIPPED | OUTPATIENT
Start: 2024-09-18

## 2024-09-18 ASSESSMENT — PATIENT HEALTH QUESTIONNAIRE - PHQ9
1. LITTLE INTEREST OR PLEASURE IN DOING THINGS: NOT AT ALL
2. FEELING DOWN, DEPRESSED OR HOPELESS: NOT AT ALL
SUM OF ALL RESPONSES TO PHQ9 QUESTIONS 1 AND 2: 0

## 2024-09-18 NOTE — ASSESSMENT & PLAN NOTE
Evaluated by hematology Dr Reyes in 4/2023  Determined no concern and to monitor levels by me    Lab Results   Component Value Date    WBC 6.9 03/13/2024    HGB 12.5 (L) 03/13/2024    HCT 39.9 (L) 03/13/2024    MCV 97 03/13/2024     03/13/2024   On iron supplement every other day  Consider increase to daily if hemoglobin lowers

## 2024-09-18 NOTE — ASSESSMENT & PLAN NOTE
Stable - A1c slightly lower = good.  Hemoglobin A1C (%)   Date Value   03/13/2024 5.7 (H)   You have pre-diabetic level blood sugar.  This means you are at risk for developing diabetes.  ;  I recommend you avoid processed carbohydrates and sugar containing foods/beverages.  If you must have a sweetened beverage, please use Stevia and avoid artificial sweeteners such as aspartame, sucralose, sweet n low, etc.;DRINK WATER!    A good diet plan to follow is the Mediterranean diet.  Use online search to read more about this.;    Another simple rule is to shop the grocery store perimeter -thus filling your cart with fresh fruits, vegetables, lean meats (fish, chicken); dairy, cheese, and nuts.  Avoid the isles where you find bagged and boxed items that are processed. (chips, crackers, cookies, candies, snacks, etc.);    Aerobic cardiovascular exercise 150min weekly is essential for good health, BP control, sugar metabolism, and weight management.;September 18, 2024

## 2024-09-18 NOTE — ASSESSMENT & PLAN NOTE
DEXA 2017 Right Forearm T -2.5; LS and Hip -1.0 and -1.2  Continue vitamin D supplement.  Encouraged to engage in regular light resistance training (bands or weights) to preserve bone structure.  Repeat DEXA 3/2024 identified improvement to osteopenia level scoring  DEXA 2017 Right Forearm T -2.5; LS and Hip -1.0 and -1.2  Continue vitamin D supplement.  Encouraged to engage in regular light resistance training (bands or weights) to preserve bone structure.  Repeat DEXA ordered to re-evaluate status

## 2024-09-18 NOTE — ASSESSMENT & PLAN NOTE
History of left CEA 5/2010 @ Dr Livingston   Ultrasound 4/2024 <50% bilateral  Stable from 2017 imaging  Continue cholesterol control, blood pressure control, and aspirin.

## 2024-09-18 NOTE — ASSESSMENT & PLAN NOTE
Lab Results   Component Value Date    CHOL 132 03/13/2024    CHOL 136 03/23/2023    CHOL 140 03/15/2021     Lab Results   Component Value Date    HDL 59.0 03/13/2024    HDL 64.7 03/23/2023    HDL 58.3 03/15/2021     Lab Results   Component Value Date    TRIG 64 03/13/2024    TRIG 58 03/23/2023    TRIG 70 03/15/2021   Stable.  Continue atorvastatin 40 mg daily.

## 2024-09-18 NOTE — PROGRESS NOTES
"Subjective     Patient ID: Judd Davies is a 79 y.o. male who presents for follow up of chronic medical conditions.    Decline flu shot at this time, would like to wait.     High blood pressure evaluation.     Review of symptoms:  Fatigue: n  Headaches:  n  Blurred vision:  n  Palpitations: n  Chest pains: n  Shortness of Breath: n  Swelling of legs: n  Blood in urine: n  Taking medications daily: y  Medication side effects:n  Problems with medication compliance:N  Baby Aspirin 81 mg daily:  y    High cholesterol evaluation.     Supplements:  y   specific diet plan: n  exercising 30 min daily?: n    Fatigue:N  Chest pains:N  Shortness of Breath:N  Sudden Headaches: n  Vision loss: n  Syncope (fainting): n   Leg Claudication (limping/leg tiredness): n  Taking medication daily: y  Medication side effects:N       He continues to follow with dermatology, his last appointment was on 9/11 to remove a basal cell carcinoma spot from his back. He will follow up in six months.  He has not had any episodes of lightheadedness or dizziness since his last appointment.  He is not taking iron every day due to the changes it causes with his bowel movements.          Review of Systems   All other systems reviewed and are negative.      Objective   /71 (BP Location: Right arm, Patient Position: Sitting, BP Cuff Size: Adult)   Pulse 71   Temp 36.6 °C (97.8 °F) (Temporal)   Resp 14   Ht 1.778 m (5' 10\")   Wt 72.4 kg (159 lb 9.6 oz)   SpO2 98%   BMI 22.90 kg/m²     Physical Exam  Constitutional:       Appearance: Normal appearance. He is normal weight.   HENT:      Head: Normocephalic.   Eyes:      Conjunctiva/sclera: Conjunctivae normal.   Cardiovascular:      Rate and Rhythm: Normal rate and regular rhythm.      Pulses: Normal pulses.      Heart sounds: Normal heart sounds.   Pulmonary:      Effort: Pulmonary effort is normal.      Breath sounds: Normal breath sounds.   Musculoskeletal:      Cervical back: Normal range " of motion.   Neurological:      General: No focal deficit present.      Mental Status: He is alert.   Psychiatric:         Mood and Affect: Mood normal.         Behavior: Behavior normal.         Thought Content: Thought content normal.         Judgment: Judgment normal.         Assessment/Plan   Problem List Items Addressed This Visit       Benign essential hypertension - Primary     Blood pressure in office today was   BP Readings from Last 1 Encounters:   09/18/24 127/71   The goal range for blood pressure is below 130/80.   We will continue to monitor.   Continue lisinopril 5 mg daily.         Relevant Medications    lisinopril 5 mg tablet    Carotid atherosclerosis     History of left CEA 5/2010 @ Dr Livingston   Ultrasound 4/2024 <50% bilateral  Stable from 2017 imaging  Continue cholesterol control, blood pressure control, and aspirin.         Mixed hyperlipidemia     Lab Results   Component Value Date    CHOL 132 03/13/2024    CHOL 136 03/23/2023    CHOL 140 03/15/2021     Lab Results   Component Value Date    HDL 59.0 03/13/2024    HDL 64.7 03/23/2023    HDL 58.3 03/15/2021     Lab Results   Component Value Date    TRIG 64 03/13/2024    TRIG 58 03/23/2023    TRIG 70 03/15/2021   Stable.  Continue atorvastatin 40 mg daily.         Relevant Medications    atorvastatin (Lipitor) 40 mg tablet    Prediabetes     Stable - A1c slightly lower = good.  Hemoglobin A1C (%)   Date Value   03/13/2024 5.7 (H)   You have pre-diabetic level blood sugar.  This means you are at risk for developing diabetes.  ;  I recommend you avoid processed carbohydrates and sugar containing foods/beverages.  If you must have a sweetened beverage, please use Stevia and avoid artificial sweeteners such as aspartame, sucralose, sweet n low, etc.;DRINK WATER!    A good diet plan to follow is the Mediterranean diet.  Use online search to read more about this.;    Another simple rule is to shop the grocery store perimeter -thus filling your  cart with fresh fruits, vegetables, lean meats (fish, chicken); dairy, cheese, and nuts.  Avoid the isles where you find bagged and boxed items that are processed. (chips, crackers, cookies, candies, snacks, etc.);    Aerobic cardiovascular exercise 150min weekly is essential for good health, BP control, sugar metabolism, and weight management.;September 18, 2024         Relevant Orders    Hemoglobin A1C    Vitamin D deficiency     Continue vitamin D supplement.  Recommended for bone health protection         Anemia in other chronic diseases classified elsewhere     Evaluated by hematology Dr Reyes in 4/2023  Determined no concern and to monitor levels by me    Lab Results   Component Value Date    WBC 6.9 03/13/2024    HGB 12.5 (L) 03/13/2024    HCT 39.9 (L) 03/13/2024    MCV 97 03/13/2024     03/13/2024   On iron supplement every other day  Consider increase to daily if hemoglobin lowers         Relevant Orders    CBC and Auto Differential    Osteopenia     DEXA 2017 Right Forearm T -2.5; LS and Hip -1.0 and -1.2  Continue vitamin D supplement.  Encouraged to engage in regular light resistance training (bands or weights) to preserve bone structure.  Repeat DEXA 3/2024 identified improvement to osteopenia level scoring  DEXA 2017 Right Forearm T -2.5; LS and Hip -1.0 and -1.2  Continue vitamin D supplement.  Encouraged to engage in regular light resistance training (bands or weights) to preserve bone structure.  Repeat DEXA ordered to re-evaluate status          Follow up: Scheduled 3/11/2025    Scribe Attestation  By signing my name below, I, Maricruz Hull   attest that this documentation has been prepared under the direction and in the presence of Haris Osborn MD.

## 2024-09-18 NOTE — ASSESSMENT & PLAN NOTE
Blood pressure in office today was   BP Readings from Last 1 Encounters:   09/18/24 127/71   The goal range for blood pressure is below 130/80.   We will continue to monitor.   Continue lisinopril 5 mg daily.

## 2024-09-19 LAB
BASOPHILS # BLD AUTO: 0.07 X10*3/UL (ref 0–0.1)
BASOPHILS NFR BLD AUTO: 0.9 %
EOSINOPHIL # BLD AUTO: 0.2 X10*3/UL (ref 0–0.4)
EOSINOPHIL NFR BLD AUTO: 2.5 %
ERYTHROCYTE [DISTWIDTH] IN BLOOD BY AUTOMATED COUNT: 13.4 % (ref 11.5–14.5)
EST. AVERAGE GLUCOSE BLD GHB EST-MCNC: 120 MG/DL
HBA1C MFR BLD: 5.8 %
HCT VFR BLD AUTO: 39.8 % (ref 41–52)
HGB BLD-MCNC: 12.7 G/DL (ref 13.5–17.5)
IMM GRANULOCYTES # BLD AUTO: 0.01 X10*3/UL (ref 0–0.5)
IMM GRANULOCYTES NFR BLD AUTO: 0.1 % (ref 0–0.9)
LYMPHOCYTES # BLD AUTO: 2.11 X10*3/UL (ref 0.8–3)
LYMPHOCYTES NFR BLD AUTO: 26.8 %
MCH RBC QN AUTO: 30.9 PG (ref 26–34)
MCHC RBC AUTO-ENTMCNC: 31.9 G/DL (ref 32–36)
MCV RBC AUTO: 97 FL (ref 80–100)
MONOCYTES # BLD AUTO: 0.8 X10*3/UL (ref 0.05–0.8)
MONOCYTES NFR BLD AUTO: 10.2 %
NEUTROPHILS # BLD AUTO: 4.69 X10*3/UL (ref 1.6–5.5)
NEUTROPHILS NFR BLD AUTO: 59.5 %
NRBC BLD-RTO: 0 /100 WBCS (ref 0–0)
PLATELET # BLD AUTO: 245 X10*3/UL (ref 150–450)
RBC # BLD AUTO: 4.11 X10*6/UL (ref 4.5–5.9)
WBC # BLD AUTO: 7.9 X10*3/UL (ref 4.4–11.3)

## 2024-11-11 ENCOUNTER — APPOINTMENT (OUTPATIENT)
Dept: DERMATOLOGY | Facility: CLINIC | Age: 79
End: 2024-11-11
Payer: MEDICARE

## 2025-03-11 ENCOUNTER — APPOINTMENT (OUTPATIENT)
Dept: PRIMARY CARE | Facility: CLINIC | Age: 80
End: 2025-03-11
Payer: MEDICARE

## 2025-03-11 VITALS
BODY MASS INDEX: 23.47 KG/M2 | OXYGEN SATURATION: 97 % | RESPIRATION RATE: 12 BRPM | DIASTOLIC BLOOD PRESSURE: 68 MMHG | SYSTOLIC BLOOD PRESSURE: 135 MMHG | WEIGHT: 158.5 LBS | HEIGHT: 69 IN | TEMPERATURE: 97.2 F | HEART RATE: 67 BPM

## 2025-03-11 DIAGNOSIS — M85.80 OSTEOPENIA, UNSPECIFIED LOCATION: ICD-10-CM

## 2025-03-11 DIAGNOSIS — I10 BENIGN ESSENTIAL HYPERTENSION: ICD-10-CM

## 2025-03-11 DIAGNOSIS — E78.2 MIXED HYPERLIPIDEMIA: ICD-10-CM

## 2025-03-11 DIAGNOSIS — Z13.220 SCREENING FOR HYPERLIPIDEMIA: ICD-10-CM

## 2025-03-11 DIAGNOSIS — R35.1 BPH ASSOCIATED WITH NOCTURIA: ICD-10-CM

## 2025-03-11 DIAGNOSIS — R73.03 PREDIABETES: ICD-10-CM

## 2025-03-11 DIAGNOSIS — D63.8 ANEMIA IN OTHER CHRONIC DISEASES CLASSIFIED ELSEWHERE: ICD-10-CM

## 2025-03-11 DIAGNOSIS — E55.9 VITAMIN D DEFICIENCY: ICD-10-CM

## 2025-03-11 DIAGNOSIS — Z00.00 ROUTINE GENERAL MEDICAL EXAMINATION AT HEALTH CARE FACILITY: Primary | ICD-10-CM

## 2025-03-11 DIAGNOSIS — N40.1 BPH ASSOCIATED WITH NOCTURIA: ICD-10-CM

## 2025-03-11 DIAGNOSIS — I65.23 ATHEROSCLEROSIS OF BOTH CAROTID ARTERIES: ICD-10-CM

## 2025-03-11 PROBLEM — R51.9 CHRONIC DAILY HEADACHE: Status: RESOLVED | Noted: 2023-02-10 | Resolved: 2025-03-11

## 2025-03-11 PROCEDURE — 1170F FXNL STATUS ASSESSED: CPT | Performed by: FAMILY MEDICINE

## 2025-03-11 PROCEDURE — 3075F SYST BP GE 130 - 139MM HG: CPT | Performed by: FAMILY MEDICINE

## 2025-03-11 PROCEDURE — G0439 PPPS, SUBSEQ VISIT: HCPCS | Performed by: FAMILY MEDICINE

## 2025-03-11 PROCEDURE — 3078F DIAST BP <80 MM HG: CPT | Performed by: FAMILY MEDICINE

## 2025-03-11 PROCEDURE — 1036F TOBACCO NON-USER: CPT | Performed by: FAMILY MEDICINE

## 2025-03-11 PROCEDURE — 1160F RVW MEDS BY RX/DR IN RCRD: CPT | Performed by: FAMILY MEDICINE

## 2025-03-11 PROCEDURE — 1123F ACP DISCUSS/DSCN MKR DOCD: CPT | Performed by: FAMILY MEDICINE

## 2025-03-11 PROCEDURE — 1157F ADVNC CARE PLAN IN RCRD: CPT | Performed by: FAMILY MEDICINE

## 2025-03-11 PROCEDURE — 1159F MED LIST DOCD IN RCRD: CPT | Performed by: FAMILY MEDICINE

## 2025-03-11 RX ORDER — ATORVASTATIN CALCIUM 40 MG/1
40 TABLET, FILM COATED ORAL DAILY
Qty: 90 TABLET | Refills: 1 | Status: SHIPPED | OUTPATIENT
Start: 2025-03-11

## 2025-03-11 RX ORDER — LISINOPRIL 5 MG/1
5 TABLET ORAL DAILY
Qty: 90 TABLET | Refills: 1 | Status: SHIPPED | OUTPATIENT
Start: 2025-03-11

## 2025-03-11 ASSESSMENT — ENCOUNTER SYMPTOMS
GASTROINTESTINAL NEGATIVE: 1
PSYCHIATRIC NEGATIVE: 1
CARDIOVASCULAR NEGATIVE: 1
RESPIRATORY NEGATIVE: 1
MUSCULOSKELETAL NEGATIVE: 1
NEUROLOGICAL NEGATIVE: 1

## 2025-03-11 ASSESSMENT — ACTIVITIES OF DAILY LIVING (ADL)
TAKING_MEDICATION: INDEPENDENT
BATHING: INDEPENDENT
DOING_HOUSEWORK: INDEPENDENT
DRESSING: INDEPENDENT
MANAGING_FINANCES: INDEPENDENT
GROCERY_SHOPPING: INDEPENDENT

## 2025-03-11 NOTE — ASSESSMENT & PLAN NOTE
Blood pressure in office today:  BP Readings from Last 1 Encounters:   03/11/25 135/68   The goal range for blood pressure is below 130/80.   We will continue to monitor.   Continue lisinopril 5 mg daily.

## 2025-03-11 NOTE — ASSESSMENT & PLAN NOTE
Lab Results   Component Value Date    HGBA1C 5.8 (H) 09/18/2024     You have pre-diabetic level blood sugar.  This means you are at risk for developing diabetes.  ;  I recommend you avoid processed carbohydrates and sugar containing foods/beverages.  If you must have a sweetened beverage, please use Stevia and avoid artificial sweeteners such as aspartame, sucralose, sweet n low, etc.;DRINK WATER!    A good diet plan to follow is the Mediterranean diet.  Use online search to read more about this.;    Another simple rule is to shop the grocery store perimeter -thus filling your cart with fresh fruits, vegetables, lean meats (fish, chicken); dairy, cheese, and nuts.  Avoid the isles where you find bagged and boxed items that are processed. (chips, crackers, cookies, candies, snacks, etc.);    Aerobic cardiovascular exercise 150min weekly is essential for good health, BP control, sugar metabolism, and weight management.;March 11, 2025

## 2025-03-11 NOTE — ASSESSMENT & PLAN NOTE
Lab Results   Component Value Date    VITD25 46 03/13/2024   Continue vitamin D supplement.  Recommended for bone health protection with osteopenia

## 2025-03-11 NOTE — ASSESSMENT & PLAN NOTE
Blood pressure in office today was   BP Readings from Last 1 Encounters:   03/11/25 135/68   The goal range for blood pressure is below 130/80.   Home BP average 111/72  We will continue to monitor.   Continue lisinopril 5 mg daily.

## 2025-03-11 NOTE — PROGRESS NOTES
"Subjective   Reason for Visit: Judd Davies is an 80 y.o. male here for a Medicare Wellness visit.     Reviewed all medications by prescribing practitioner or clinical pharmacist (such as prescriptions, OTCs, herbal therapies and supplements) and documented in the medical record.    HPI    Patient Care Team:  Haris Osborn MD as PCP - General  Haris Osborn MD as PCP - [employer] PCP     Review of Systems    Objective   Vitals:  /68 (BP Location: Left arm, Patient Position: Sitting, BP Cuff Size: Adult)   Pulse 67   Temp 36.2 °C (97.2 °F) (Temporal)   Resp 12   Ht 1.74 m (5' 8.5\")   Wt 71.9 kg (158 lb 8 oz)   SpO2 97%   BMI 23.75 kg/m²       Physical Exam    Problem List Items Addressed This Visit       Benign essential hypertension     Blood pressure in office today:  BP Readings from Last 1 Encounters:   03/11/25 135/68   The goal range for blood pressure is below 130/80.   We will continue to monitor.   Continue lisinopril 5 mg daily.         Mixed hyperlipidemia     Lab Results   Component Value Date    CHOL 132 03/13/2024    CHOL 136 03/23/2023    CHOL 140 03/15/2021     Lab Results   Component Value Date    HDL 59.0 03/13/2024    HDL 64.7 03/23/2023    HDL 58.3 03/15/2021     Lab Results   Component Value Date    LDLCALC 60 03/13/2024     Lab Results   Component Value Date    TRIG 64 03/13/2024    TRIG 58 03/23/2023    TRIG 70 03/15/2021   Stable.  Continue atorvastatin 40 mg daily.          Follow up: Scheduled 9/16/2025    Scribe Attestation  By signing my name below, I, Maricruz Hull   attest that this documentation has been prepared under the direction and in the presence of Haris Osborn MD.  "

## 2025-03-11 NOTE — ASSESSMENT & PLAN NOTE
Lab Results   Component Value Date    CHOL 132 03/13/2024    CHOL 136 03/23/2023    CHOL 140 03/15/2021     Lab Results   Component Value Date    HDL 59.0 03/13/2024    HDL 64.7 03/23/2023    HDL 58.3 03/15/2021     Lab Results   Component Value Date    LDLCALC 60 03/13/2024     Lab Results   Component Value Date    TRIG 64 03/13/2024    TRIG 58 03/23/2023    TRIG 70 03/15/2021   Stable.  Continue atorvastatin 40 mg daily.

## 2025-03-11 NOTE — PROGRESS NOTES
"Subjective   Reason for Visit: Judd Davies is an 80 y.o. male here for a Medicare Wellness visit.     Reviewed all medications by prescribing practitioner or clinical pharmacist (such as prescriptions, OTCs, herbal therapies and supplements) and documented in the medical record.    He is doing well overall. He reports being about two inches shorter than he used to be and he has been losing weight very slowly, not intentionally. He has a balanced diet and does eat meats for protein but he does not drink any type of protein shakes.   He would like to establish with an ophthalmologist, he has a cataract on his left eye. It is not bad at this time but he knows he will need to have it removed eventually.   He denies any hearing difficulties.   He keeps up with dermatology, Dr. Pabon, for regular skin checks since removing a basal cell previously.         Patient Care Team:  Haris Osborn MD as PCP - General  Haris Osborn MD as PCP - [employer] PCP     Review of Systems   HENT: Negative.     Respiratory: Negative.     Cardiovascular: Negative.    Gastrointestinal: Negative.    Genitourinary: Negative.    Musculoskeletal: Negative.    Neurological: Negative.    Psychiatric/Behavioral: Negative.         Objective   Vitals:  /68 (BP Location: Left arm, Patient Position: Sitting, BP Cuff Size: Adult)   Pulse 67   Temp 36.2 °C (97.2 °F) (Temporal)   Resp 12   Ht 1.74 m (5' 8.5\")   Wt 71.9 kg (158 lb 8 oz)   SpO2 97%   BMI 23.75 kg/m²       Physical Exam  Constitutional:       Appearance: Normal appearance. He is normal weight.   HENT:      Head: Normocephalic.      Right Ear: Tympanic membrane normal.      Left Ear: Tympanic membrane normal.      Nose: Nose normal.      Mouth/Throat:      Mouth: Mucous membranes are moist.      Pharynx: Oropharynx is clear.   Eyes:      Conjunctiva/sclera: Conjunctivae normal.   Cardiovascular:      Rate and Rhythm: Normal rate and regular rhythm.      " Pulses: Normal pulses.      Heart sounds: Normal heart sounds.   Pulmonary:      Effort: Pulmonary effort is normal.      Breath sounds: Normal breath sounds.   Abdominal:      General: Abdomen is flat. Bowel sounds are normal.      Palpations: Abdomen is soft.   Musculoskeletal:         General: Normal range of motion.      Cervical back: Normal range of motion.   Skin:     General: Skin is warm and dry.   Neurological:      General: No focal deficit present.      Mental Status: He is alert and oriented to person, place, and time.   Psychiatric:         Mood and Affect: Mood normal.         Behavior: Behavior normal.         Thought Content: Thought content normal.         Judgment: Judgment normal.         Problem List Items Addressed This Visit       Benign essential hypertension     Blood pressure in office today was   BP Readings from Last 1 Encounters:   03/11/25 135/68   The goal range for blood pressure is below 130/80.   Home BP average 111/72  We will continue to monitor.   Continue lisinopril 5 mg daily.         Relevant Medications    lisinopril 5 mg tablet    BPH associated with nocturia     Wakes up 2-3 times per night to urinate. Current symptoms are tolerable.  Failed on Flomax in the past.  Discussed treatment with finasteride but was declined by patient.         Carotid atherosclerosis     History of left CEA 5/2010 @ Dr Livingston   Ultrasound 4/2024 <50% bilateral  Stable from 2017 imaging  Continue cholesterol control, blood pressure control, and aspirin.         Mixed hyperlipidemia     Lab Results   Component Value Date    CHOL 132 03/13/2024    CHOL 136 03/23/2023    CHOL 140 03/15/2021     Lab Results   Component Value Date    HDL 59.0 03/13/2024    HDL 64.7 03/23/2023    HDL 58.3 03/15/2021     Lab Results   Component Value Date    TRIG 64 03/13/2024    TRIG 58 03/23/2023    TRIG 70 03/15/2021   Stable.  Continue atorvastatin 40 mg daily.         Relevant Medications    atorvastatin  (Lipitor) 40 mg tablet    Prediabetes     Lab Results   Component Value Date    HGBA1C 5.8 (H) 09/18/2024     You have pre-diabetic level blood sugar.  This means you are at risk for developing diabetes.  ;  I recommend you avoid processed carbohydrates and sugar containing foods/beverages.  If you must have a sweetened beverage, please use Stevia and avoid artificial sweeteners such as aspartame, sucralose, sweet n low, etc.;DRINK WATER!    A good diet plan to follow is the Mediterranean diet.  Use online search to read more about this.;    Another simple rule is to shop the grocery store perimeter -thus filling your cart with fresh fruits, vegetables, lean meats (fish, chicken); dairy, cheese, and nuts.  Avoid the isles where you find bagged and boxed items that are processed. (chips, crackers, cookies, candies, snacks, etc.);    Aerobic cardiovascular exercise 150min weekly is essential for good health, BP control, sugar metabolism, and weight management.;March 11, 2025         Relevant Orders    Hemoglobin A1C    Vitamin D deficiency     Lab Results   Component Value Date    VITD25 46 03/13/2024   Continue vitamin D supplement.  Recommended for bone health protection with osteopenia         Relevant Orders    Vitamin D 25-Hydroxy,Total (for eval of Vitamin D levels)    Anemia in other chronic diseases classified elsewhere     Evaluated by hematology Dr Reyes in 4/2023  Determined no concern and to monitor levels by me  Taking daily 65mg iron    Lab Results   Component Value Date    WBC 7.9 09/18/2024    HGB 12.7 (L) 09/18/2024    HCT 39.8 (L) 09/18/2024    MCV 97 09/18/2024     09/18/2024   On iron supplement every other day         Relevant Orders    CBC and Auto Differential    Iron and TIBC    Osteopenia     Stable.  Vitamin D and calcium important.  Light weight exercises recommended.          Other Visit Diagnoses       Routine general medical examination at health care facility    -  Primary     Relevant Orders    1 Year Follow Up In [facility] - PCP - Wellness Exam    Comprehensive Metabolic Panel    Screening for hyperlipidemia        Relevant Orders    Lipid Panel          Follow up: Scheduled 9/16/2025    Scribe Attestation  By signing my name below, I, Maricruz Hull   attest that this documentation has been prepared under the direction and in the presence of Haris Osborn MD.

## 2025-03-11 NOTE — ASSESSMENT & PLAN NOTE
Evaluated by hematology Dr Reyes in 4/2023  Determined no concern and to monitor levels by me  Taking daily 65mg iron    Lab Results   Component Value Date    WBC 7.9 09/18/2024    HGB 12.7 (L) 09/18/2024    HCT 39.8 (L) 09/18/2024    MCV 97 09/18/2024     09/18/2024   On iron supplement every other day

## 2025-03-12 LAB
25(OH)D3+25(OH)D2 SERPL-MCNC: 42 NG/ML (ref 30–100)
ALBUMIN SERPL-MCNC: 4.5 G/DL (ref 3.6–5.1)
ALP SERPL-CCNC: 60 U/L (ref 35–144)
ALT SERPL-CCNC: 23 U/L (ref 9–46)
ANION GAP SERPL CALCULATED.4IONS-SCNC: 12 MMOL/L (CALC) (ref 7–17)
AST SERPL-CCNC: 26 U/L (ref 10–35)
BASOPHILS # BLD AUTO: 49 CELLS/UL (ref 0–200)
BASOPHILS NFR BLD AUTO: 0.7 %
BILIRUB SERPL-MCNC: 0.9 MG/DL (ref 0.2–1.2)
BUN SERPL-MCNC: 25 MG/DL (ref 7–25)
CALCIUM SERPL-MCNC: 9.4 MG/DL (ref 8.6–10.3)
CHLORIDE SERPL-SCNC: 103 MMOL/L (ref 98–110)
CHOLEST SERPL-MCNC: 147 MG/DL
CHOLEST/HDLC SERPL: 2.3 (CALC)
CO2 SERPL-SCNC: 24 MMOL/L (ref 20–32)
CREAT SERPL-MCNC: 1.22 MG/DL (ref 0.7–1.22)
EGFRCR SERPLBLD CKD-EPI 2021: 59 ML/MIN/1.73M2
EOSINOPHIL # BLD AUTO: 112 CELLS/UL (ref 15–500)
EOSINOPHIL NFR BLD AUTO: 1.6 %
ERYTHROCYTE [DISTWIDTH] IN BLOOD BY AUTOMATED COUNT: 12.8 % (ref 11–15)
EST. AVERAGE GLUCOSE BLD GHB EST-MCNC: 126 MG/DL
EST. AVERAGE GLUCOSE BLD GHB EST-SCNC: 7 MMOL/L
GLUCOSE SERPL-MCNC: 91 MG/DL (ref 65–99)
HBA1C MFR BLD: 6 % OF TOTAL HGB
HCT VFR BLD AUTO: 37.7 % (ref 38.5–50)
HDLC SERPL-MCNC: 64 MG/DL
HGB BLD-MCNC: 12.8 G/DL (ref 13.2–17.1)
IRON SATN MFR SERPL: 43 % (CALC) (ref 20–48)
IRON SERPL-MCNC: 102 MCG/DL (ref 50–180)
LDLC SERPL CALC-MCNC: 67 MG/DL (CALC)
LYMPHOCYTES # BLD AUTO: 2205 CELLS/UL (ref 850–3900)
LYMPHOCYTES NFR BLD AUTO: 31.5 %
MCH RBC QN AUTO: 31.6 PG (ref 27–33)
MCHC RBC AUTO-ENTMCNC: 34 G/DL (ref 32–36)
MCV RBC AUTO: 93.1 FL (ref 80–100)
MONOCYTES # BLD AUTO: 742 CELLS/UL (ref 200–950)
MONOCYTES NFR BLD AUTO: 10.6 %
NEUTROPHILS # BLD AUTO: 3892 CELLS/UL (ref 1500–7800)
NEUTROPHILS NFR BLD AUTO: 55.6 %
NONHDLC SERPL-MCNC: 83 MG/DL (CALC)
PLATELET # BLD AUTO: 267 THOUSAND/UL (ref 140–400)
PMV BLD REES-ECKER: 10.4 FL (ref 7.5–12.5)
POTASSIUM SERPL-SCNC: 5.1 MMOL/L (ref 3.5–5.3)
PROT SERPL-MCNC: 7.8 G/DL (ref 6.1–8.1)
RBC # BLD AUTO: 4.05 MILLION/UL (ref 4.2–5.8)
SODIUM SERPL-SCNC: 139 MMOL/L (ref 135–146)
TIBC SERPL-MCNC: 240 MCG/DL (CALC) (ref 250–425)
TRIGL SERPL-MCNC: 80 MG/DL
WBC # BLD AUTO: 7 THOUSAND/UL (ref 3.8–10.8)

## 2025-03-26 ENCOUNTER — APPOINTMENT (OUTPATIENT)
Dept: DERMATOLOGY | Facility: CLINIC | Age: 80
End: 2025-03-26
Payer: MEDICARE

## 2025-03-26 DIAGNOSIS — D18.01 HEMANGIOMA OF SKIN: ICD-10-CM

## 2025-03-26 DIAGNOSIS — Z12.83 SCREENING EXAM FOR SKIN CANCER: ICD-10-CM

## 2025-03-26 DIAGNOSIS — D22.9 MULTIPLE BENIGN NEVI: Primary | ICD-10-CM

## 2025-03-26 DIAGNOSIS — L81.4 LENTIGO: ICD-10-CM

## 2025-03-26 DIAGNOSIS — L82.1 SEBORRHEIC KERATOSIS: ICD-10-CM

## 2025-03-26 DIAGNOSIS — Z85.828 PERSONAL HISTORY OF SKIN CANCER: ICD-10-CM

## 2025-03-26 PROCEDURE — 1157F ADVNC CARE PLAN IN RCRD: CPT | Performed by: DERMATOLOGY

## 2025-03-26 PROCEDURE — 1159F MED LIST DOCD IN RCRD: CPT | Performed by: DERMATOLOGY

## 2025-03-26 PROCEDURE — 1036F TOBACCO NON-USER: CPT | Performed by: DERMATOLOGY

## 2025-03-26 PROCEDURE — 99213 OFFICE O/P EST LOW 20 MIN: CPT | Performed by: DERMATOLOGY

## 2025-03-26 PROCEDURE — 1123F ACP DISCUSS/DSCN MKR DOCD: CPT | Performed by: DERMATOLOGY

## 2025-03-26 PROCEDURE — 1160F RVW MEDS BY RX/DR IN RCRD: CPT | Performed by: DERMATOLOGY

## 2025-03-26 NOTE — PROGRESS NOTES
Subjective     Judd Davies is a 80 y.o. male who presents for the following: Skin Check (Personal history of basal cell carcinoma. Personal history of Actinic Keratosis. Chaperone offered and declined.  ).     Review of Systems:  No other skin or systemic complaints other than what is documented elsewhere in the note.    The following portions of the chart were reviewed this encounter and updated as appropriate:  Tobacco  Allergies  Meds  Problems  Med Hx  Surg Hx  Fam Hx         Skin Cancer History  Biopsy Date Type Location Status   9/3/24 BCC, Superficial Right Upper Back Treatment Complete  9/11/24          Objective     Well appearing patient in no apparent distress; mood and affect are within normal limits.    A full examination was performed including scalp, face, neck, chest, abdomen, back, bilateral upper extremities, bilateral lower extremities. Patient declines exam underneath the underwear; patient declines exam of the lower abdomen/mons pubis, buttocks, groin, genitalia, perineal and perianal skin and these areas were not examined.    All findings within normal limits unless otherwise noted below.    Assessment/Plan   1. Multiple benign nevi  Brown and tan macules and papules with reassuring findings on dermoscopy    -These lesions have benign, reassuring patterns on dermoscopy  -Recommend continued self observation, and to contact the office if any changes in nevi are noticed    2. Lentigo  Tan macules    -Benign appearing on exam  -Reassurance, recommend observation    3. Seborrheic keratosis  Stuck on, waxy macule(s)/papule(s)/plaque(s) with comedo-like openings and milia like cysts    -Discussed the nature of the diagnosis  -Reassurance, recommend continued observation    4. Hemangioma of skin  Cherry red papules    -Discussed the nature of the diagnosis  -Reassurance, recommend continued observation    5. Personal history of skin cancer    Personal History of Non-Melanoma Skin Cancer, BCC  R upper back, September 2024  -Well healed scar(s) with no evidence of recurrence  -Discussed the need for annual or semi-annual skin examinations and to return sooner if any new or changing lesions are noticed. Patient verbalizes understanding    6. Screening exam for skin cancer    Related Procedures  Follow Up In Dermatology - Established Patient        Discussed/information given on safe sun practices and use of sunscreen, sun protective clothing or sun avoidance. Recommend to use over the counter medication of sunscreen with a SPF 30 or higher on a daily basis prior to sun exposure to reduce the risk of skin cancer.    Follow up in 6 months for FSE  Discussed if there are any changes or development of concerning symptoms (lesion/skin condition is changing, bleeding, enlarging, or worsening) the patient is to contact my office. The patient verbalizes understanding.     Cassie Pabon MD  3/26/2025

## 2025-04-11 ENCOUNTER — TELEPHONE (OUTPATIENT)
Dept: PRIMARY CARE | Facility: CLINIC | Age: 80
End: 2025-04-11
Payer: MEDICARE

## 2025-04-11 NOTE — TELEPHONE ENCOUNTER
Called to schedule an appt with pcp stated he was having sharp pains in chest that only last for a few seconds advised patient to go to the er or urgent care stated he would wait on his pcp because it was a concern but only lasted for a second or two, appt is booked

## 2025-05-07 ENCOUNTER — APPOINTMENT (OUTPATIENT)
Dept: PRIMARY CARE | Facility: CLINIC | Age: 80
End: 2025-05-07
Payer: MEDICARE

## 2025-07-30 ENCOUNTER — OFFICE VISIT (OUTPATIENT)
Dept: DERMATOLOGY | Facility: CLINIC | Age: 80
End: 2025-07-30
Payer: MEDICARE

## 2025-07-30 ENCOUNTER — TELEPHONE (OUTPATIENT)
Dept: DERMATOLOGY | Facility: CLINIC | Age: 80
End: 2025-07-30
Payer: MEDICARE

## 2025-07-30 DIAGNOSIS — L81.4 LENTIGO: ICD-10-CM

## 2025-07-30 DIAGNOSIS — L82.0 INFLAMED SEBORRHEIC KERATOSIS: Primary | ICD-10-CM

## 2025-07-30 DIAGNOSIS — L82.1 SEBORRHEIC KERATOSIS: ICD-10-CM

## 2025-07-30 PROCEDURE — 1036F TOBACCO NON-USER: CPT | Performed by: DERMATOLOGY

## 2025-07-30 PROCEDURE — 17110 DESTRUCTION B9 LES UP TO 14: CPT | Performed by: DERMATOLOGY

## 2025-07-30 PROCEDURE — 1159F MED LIST DOCD IN RCRD: CPT | Performed by: DERMATOLOGY

## 2025-07-30 PROCEDURE — 99213 OFFICE O/P EST LOW 20 MIN: CPT | Performed by: DERMATOLOGY

## 2025-07-30 PROCEDURE — 1160F RVW MEDS BY RX/DR IN RCRD: CPT | Performed by: DERMATOLOGY

## 2025-07-30 NOTE — Clinical Note
Stuck-on, waxy plaque with comedo-like openings and milia-like cysts with surrounding erythema and crusting

## 2025-07-30 NOTE — PROGRESS NOTES
Subjective     Judd Davies is a 80 y.o. male who presents for the following: Suspicious Skin Lesion (R jaw x 2-3 months. Pt denied discomfort to site. Pt stated treatment with OTC natural cream with no response. ).     Review of Systems:  No other skin or systemic complaints other than what is documented elsewhere in the note.    The following portions of the chart were reviewed this encounter and updated as appropriate:   Tobacco  Allergies  Meds  Problems  Med Hx  Surg Hx  Fam Hx         Skin Cancer History  Biopsy Log Book  Biopsied Type Location Status   9/3/24 BCC, Superficial Right Upper Back Treatment Complete  9/11/24       Additional History         Objective   Well appearing patient in no apparent distress; mood and affect are within normal limits.    A focused skin examination was performed. All findings within normal limits unless otherwise noted below.    Assessment/Plan   Skin Exam  1. INFLAMED SEBORRHEIC KERATOSIS  Right Parotid Area  Stuck-on, waxy plaque with comedo-like openings and milia-like cysts with surrounding erythema and crusting  (lesion of patient's concern)  -Patient requests cryotherapy today for these clinically inflamed lesions  -Possible side effects of liquid nitrogen treatment reviewed including formation of blisters, crusting, tenderness, scar, and discoloration which may be permanent.  - Destr of lesion - Right Parotid Area  Complexity: simple    Destruction method: cryotherapy    Informed consent: discussed and consent obtained    Lesion destroyed using liquid nitrogen: Yes    Outcome: patient tolerated procedure well with no complications      2. SEBORRHEIC KERATOSIS (2)  Right Anterior Neck, Right Parotid Area  Stuck on, waxy macule(s)/papule(s)/plaque(s) with comedo-like openings and milia like cysts  -Discussed the nature of the diagnosis  -Reassurance, recommend continued observation  3. LENTIGO (2)  Right Anterior Neck, Right Parotid Area  Tan macules  -Benign  appearing on exam  -Reassurance, recommend observation    Follow up in September for FSE as scheduled  Discussed if there are any changes or development of concerning symptoms (lesion/skin condition is changing, bleeding, enlarging, or worsening) the patient is to contact my office. The patient verbalizes understanding.    Cassie Pabon MD  7/30/2025

## 2025-07-30 NOTE — Clinical Note
(lesion of patient's concern)  -Patient requests cryotherapy today for these clinically inflamed lesions  -Possible side effects of liquid nitrogen treatment reviewed including formation of blisters, crusting, tenderness, scar, and discoloration which may be permanent.

## 2025-07-30 NOTE — TELEPHONE ENCOUNTER
Pt called and inquired if he is able to move is appt to being sooner than September. RN informed pt that this information will be sent over to [facility] and they will call them to see if anything sooner is available. Pt stated to call wife's cell phone and provided number. RN notified pt this number will be forwarded to [facility] to call. Patient verbalized understanding and has no further questions or concerns.     Monse PAYNEN RN

## 2025-09-03 DIAGNOSIS — I10 BENIGN ESSENTIAL HYPERTENSION: ICD-10-CM

## 2025-09-03 RX ORDER — LISINOPRIL 5 MG/1
5 TABLET ORAL DAILY
Qty: 90 TABLET | Refills: 3 | Status: SHIPPED | OUTPATIENT
Start: 2025-09-03

## 2025-09-08 ENCOUNTER — APPOINTMENT (OUTPATIENT)
Dept: DERMATOLOGY | Facility: CLINIC | Age: 80
End: 2025-09-08
Payer: MEDICARE

## 2025-09-16 ENCOUNTER — APPOINTMENT (OUTPATIENT)
Dept: PRIMARY CARE | Facility: CLINIC | Age: 80
End: 2025-09-16
Payer: MEDICARE

## 2025-10-01 ENCOUNTER — APPOINTMENT (OUTPATIENT)
Dept: PRIMARY CARE | Facility: CLINIC | Age: 80
End: 2025-10-01
Payer: MEDICARE